# Patient Record
Sex: FEMALE | Race: WHITE | Employment: FULL TIME | RURAL
[De-identification: names, ages, dates, MRNs, and addresses within clinical notes are randomized per-mention and may not be internally consistent; named-entity substitution may affect disease eponyms.]

---

## 2017-01-11 ENCOUNTER — PATIENT OUTREACH (OUTPATIENT)
Dept: FAMILY MEDICINE CLINIC | Age: 62
End: 2017-01-11

## 2017-01-11 NOTE — PROGRESS NOTES
NNTOCED [facility] 9/30-10/1/2016 Atypical chest pain    This episode closed. Patient attended all follow up appointments and had no readmissions during 30 day GALINDO.

## 2017-10-10 ENCOUNTER — OFFICE VISIT (OUTPATIENT)
Dept: FAMILY MEDICINE CLINIC | Age: 62
End: 2017-10-10

## 2017-10-10 VITALS
BODY MASS INDEX: 22.73 KG/M2 | HEART RATE: 61 BPM | TEMPERATURE: 97.2 F | SYSTOLIC BLOOD PRESSURE: 130 MMHG | WEIGHT: 150 LBS | OXYGEN SATURATION: 98 % | HEIGHT: 68 IN | DIASTOLIC BLOOD PRESSURE: 69 MMHG | RESPIRATION RATE: 16 BRPM

## 2017-10-10 DIAGNOSIS — Z00.00 WELL WOMAN EXAM (NO GYNECOLOGICAL EXAM): Primary | ICD-10-CM

## 2017-10-10 DIAGNOSIS — L98.9 SKIN LESION: ICD-10-CM

## 2017-10-10 DIAGNOSIS — K90.49 FOOD INTOLERANCE: ICD-10-CM

## 2017-10-10 DIAGNOSIS — R53.83 FATIGUE, UNSPECIFIED TYPE: ICD-10-CM

## 2017-10-10 DIAGNOSIS — K58.9 IRRITABLE BOWEL SYNDROME WITHOUT DIARRHEA: Chronic | ICD-10-CM

## 2017-10-10 DIAGNOSIS — H11.001 PTERYGIUM OF RIGHT EYE: ICD-10-CM

## 2017-10-10 DIAGNOSIS — M19.041 PRIMARY OSTEOARTHRITIS OF BOTH HANDS: ICD-10-CM

## 2017-10-10 DIAGNOSIS — M19.042 PRIMARY OSTEOARTHRITIS OF BOTH HANDS: ICD-10-CM

## 2017-10-10 LAB
BILIRUB UR QL STRIP: NEGATIVE
GLUCOSE UR-MCNC: NEGATIVE MG/DL
KETONES P FAST UR STRIP-MCNC: NEGATIVE MG/DL
PH UR STRIP: 7 [PH] (ref 4.6–8)
PROT UR QL STRIP: NEGATIVE MG/DL
SP GR UR STRIP: 1.01 (ref 1–1.03)
UA UROBILINOGEN AMB POC: NORMAL (ref 0.2–1)
URINALYSIS CLARITY POC: CLEAR
URINALYSIS COLOR POC: YELLOW
URINE BLOOD POC: NEGATIVE
URINE LEUKOCYTES POC: NEGATIVE
URINE NITRITES POC: NEGATIVE

## 2017-10-10 RX ORDER — ASCORBIC ACID 500 MG
1500 TABLET ORAL
COMMUNITY
End: 2018-12-04

## 2017-10-10 NOTE — PROGRESS NOTES
Progress Note    Patient: Joe Morrell MRN: [id number]  SSN: [id number]    YOB: 1955  Age: 57 y.o. Sex: female        Chief Complaint   Patient presents with    Complete Physical         Subjective:     Encounter Diagnoses   Name Primary?  Well woman exam (no gynecological exam): She has been doing extremely well. She is a limited all red meats from her diet. She mainly eats fruits and vegetables and a few white meats. She feels much better on this diet. Yes    Irritable bowel syndrome without diarrhea: Much better control of her current diet.  Fatigue, unspecified type: She has occasional fatigue and for that reason we should screen her thyroid.  Food intolerance: As above red or greasy meats trigger her IBS.  Skin lesion: She is a suspicious skin lesion on her right lower shin. It blanches but has a raised border. I worry that it could be a early skin cancer and she will need to see the dermatologist.         Pterygium of right eye: She has a tiny pterygium beginning on the medial aspect of her left Iris. It is not encroaching on the IRIS as yet.  Primary osteoarthritis of both hands: she has no redness but  has some pain at the site of an old injury on her right hand, fourth finger. She also has Heberden's nodes forming. Current and past medical information:    Current Medications after this visit[de-identified]     Current Outpatient Prescriptions   Medication Sig    ascorbic acid, vitamin C, (VITAMIN C) 500 mg tablet Take 1,500 mg by mouth.  ferrous sulfate (IRON) 325 mg (65 mg iron) tablet Take 27 mg by mouth Daily (before breakfast).  resveratrol 100 mg cap Take  by mouth.  ROYAL JELLY/BEE POLLEN/P.GINSG (Kiswahili GINSENG COMPLEX PO) Take  by mouth.  nikolai root, bulk, powd by Does Not Apply route.  Calcium-Cholecalciferol, D3, (CALCIUM 600 WITH VITAMIN D3) 600 mg(1,500mg) -400 unit cap Take  by mouth daily.       Cholecalciferol, Vitamin D3, (VITAMIN D3) 1,000 unit cap Take  by mouth daily.  Omega-3 Fatty Acids-Fish Oil (OMEGA 3 FISH OIL) 684-1,200 mg CpDR Take  by mouth.  biotin 500 mcg cap Take  by mouth.  PV W-O CORBY/FERROUS FUMARATE/FA (M-VIT PO) Take  by mouth.  VITAMIN B COMPLEX (B COMPLEX PO) Take  by mouth.  GARLIC PO Take  by mouth.  GINKGO BILOBA PO Take 120 mg by mouth daily. No current facility-administered medications for this visit. Patient Active Problem List    Diagnosis Date Noted    IBS (irritable bowel syndrome) 03/22/2012    Chest discomfort 05/03/2011       Past Medical History:   Diagnosis Date    Anemia NEC     IBS (irritable bowel syndrome)        No Known Allergies    Past Surgical History:   Procedure Laterality Date    DILATION AND CURETTAGE      HX GYN  1979    D&C       Social History     Social History    Marital status:      Spouse name: N/A    Number of children: N/A    Years of education: N/A     Social History Main Topics    Smoking status: Never Smoker    Smokeless tobacco: Never Used    Alcohol use No    Drug use: No    Sexual activity: Not Asked     Other Topics Concern    None     Social History Narrative       Review of Systems   Constitutional: Negative. Negative for chills, fever, malaise/fatigue and weight loss. HENT: Negative. Negative for hearing loss. Eyes: Negative. Negative for blurred vision and double vision. Respiratory: Negative. Negative for cough, hemoptysis, sputum production and shortness of breath. Cardiovascular: Negative. Negative for chest pain, palpitations and orthopnea. Gastrointestinal: Negative. Negative for abdominal pain, blood in stool, constipation, diarrhea, heartburn, nausea and vomiting. Genitourinary: Negative. Negative for dysuria, frequency and urgency. Musculoskeletal: Positive for joint pain. Negative for back pain, myalgias and neck pain. Skin: Negative. Negative for rash.    Neurological: Negative. Negative for dizziness, tingling, tremors, weakness and headaches. Endo/Heme/Allergies: Negative. Psychiatric/Behavioral: Negative. Negative for depression. Objective:     Vitals:    10/10/17 0919   BP: 130/69   Pulse: 61   Resp: 16   Temp: 97.2 °F (36.2 °C)   TempSrc: Oral   SpO2: 98%   Weight: 150 lb (68 kg)   Height: 5' 8\" (1.727 m)      Body mass index is 22.81 kg/(m^2). Physical Exam   Constitutional: She is oriented to person, place, and time and well-developed, well-nourished, and in no distress. No distress. HENT:   Head: Normocephalic and atraumatic. Mouth/Throat: Oropharynx is clear and moist.   Eyes: Conjunctivae are normal.   Neck: No tracheal deviation present. No thyromegaly present. Cardiovascular: Normal rate, regular rhythm and normal heart sounds. No murmur heard. Pulmonary/Chest: Effort normal and breath sounds normal. No respiratory distress. Abdominal: Soft. She exhibits no distension. Lymphadenopathy:     She has no cervical adenopathy. Neurological: She is alert and oriented to person, place, and time. Skin: Skin is warm. No rash noted. She is not diaphoretic. No erythema. Psychiatric: Mood and affect normal.   Nursing note and vitals reviewed. Health Maintenance Due   Topic Date Due    BREAST CANCER SCRN MAMMOGRAM  07/13/2017    INFLUENZA AGE 9 TO ADULT  08/01/2017         Assessment and orders:       ICD-10-CM ICD-9-CM    1. Well woman exam (no gynecological exam) M38.07 T19.8 METABOLIC PANEL, COMPREHENSIVE      LIPID PANEL      CBC W/O DIFF      TSH 3RD GENERATION      AMB POC URINALYSIS DIP STICK AUTO W/O MICRO   2. Irritable bowel syndrome without diarrhea B96.6 457.2 METABOLIC PANEL, COMPREHENSIVE      CBC W/O DIFF   3. Fatigue, unspecified type X24.04 128.84 METABOLIC PANEL, COMPREHENSIVE      TSH 3RD GENERATION   4. Food intolerance   K90.49 579.8 CBC W/O DIFF   5. Skin lesion   L98.9 709.9    6.  Pterygium of right eye   H11.001 372. 40    7. Primary osteoarthritis of both hands M19.041 715.14     M19.042       Plan of care:  Discussed diagnoses in detail with patient. Medication risks/benefits/side effects discussed with patient. All of the patient's questions were addressed. The patient understands and agrees with our plan of care. The patient knows to call back if they are unsure of or forget any changes we discussed today or if the symptoms change. The patient received an After-Visit Summary which contains VS, orders, medication list and allergy list. This can be used as a \"mini-medical record\" should they have to seek medical care while out of town. Patient Care Team:  Watson Lott MD as PCP - Vin Munguia MD (Dermatology)  Ronny Goldman RN as Ambulatory Care Navigator    Follow-up Disposition:  Return in about 1 year (around 10/10/2018). No future appointments.     Signed By: Watson Lott MD     October 10, 2017

## 2017-10-10 NOTE — MR AVS SNAPSHOT
Visit Information Date & Time Provider Department Dept. Phone Encounter #  
 10/10/2017  9:00 AM Rosendo Beckham MD [address] [place] [address] [id number] Follow-up Instructions Return in about 1 year (around 10/10/2018). Upcoming Health Maintenance Date Due  
 BREAST CANCER SCRN MAMMOGRAM 7/13/2017 INFLUENZA AGE 9 TO ADULT 8/1/2017 PAP AKA CERVICAL CYTOLOGY 7/2/2018 DTaP/Tdap/Td series (2 - Td) 9/19/2022 COLONOSCOPY 9/23/2024 Allergies as of 10/10/2017  Review Complete On: 10/10/2017 By: Primus Para, LPN No Known Allergies Current Immunizations  Reviewed on 5/24/2013 Name Date Influenza Vaccine Split 10/4/2012 Influenza Vaccine Whole 11/7/2010 Lyme Disease Vaccine  Incomplete TD Vaccine 2/7/2011 TDAP Vaccine 9/19/2012 ZZZ-RETIRED (DO NOT USE) Pneumococcal Vaccine (Unspecified Type) 3/7/2011 Zoster Vaccine, Live 3/16/2017 Not reviewed this visit You Were Diagnosed With   
  
 Codes Comments Well woman exam (no gynecological exam)    -  Primary ICD-10-CM: Z00.00 ICD-9-CM: V70.0 Irritable bowel syndrome without diarrhea     ICD-10-CM: K58.9 ICD-9-CM: 631.1 Fatigue, unspecified type     ICD-10-CM: R53.83 ICD-9-CM: 780.79 Food intolerance     ICD-10-CM: K90.49 ICD-9-CM: 579.8 Skin lesion     ICD-10-CM: L98.9 ICD-9-CM: 709.9 Pterygium of right eye     ICD-10-CM: H11.001 ICD-9-CM: 372.40 Primary osteoarthritis of both hands     ICD-10-CM: M19.041, D80.834 ICD-9-CM: 715.14 Vitals BP Pulse Temp Resp Height(growth percentile) Weight(growth percentile) 130/69 (BP 1 Location: Left arm, BP Patient Position: Sitting) 61 97.2 °F (36.2 °C) (Oral) 16 5' 8\" (1.727 m) 150 lb (68 kg) SpO2 BMI OB Status Smoking Status 98% 22.81 kg/m2 Postmenopausal Never Smoker Vitals History BMI and BSA Data Body Mass Index Body Surface Area 22.81 kg/m 2 1.81 m 2 Preferred Pharmacy Pharmacy Name Phone [address] [facility] [place] [address] [facility] [phone] Your Updated Medication List  
  
   
This list is accurate as of: 10/10/17  9:59 AM.  Always use your most recent med list.  
  
  
  
  
 B COMPLEX PO Take  by mouth.  
  
 biotin 500 mcg Cap Take  by mouth. CALCIUM 600 WITH VITAMIN D3 600 mg(1,500mg) -400 unit Cap Generic drug:  Calcium-Cholecalciferol (D3) Take  by mouth daily. GARLIC PO Take  by mouth.  
  
 nikolai root (bulk) Powd  
by Does Not Apply route. GINKGO BILOBA PO Take 120 mg by mouth daily. Iron 325 mg (65 mg iron) tablet Generic drug:  ferrous sulfate Take 27 mg by mouth Daily (before breakfast). KOREAN GINSENG COMPLEX PO Take  by mouth. M-VIT PO Take  by mouth. OMEGA 3 FISH -1,200 mg Cpdr  
Generic drug:  Omega-3 Fatty Acids-Fish Oil Take  by mouth. resveratrol 100 mg Cap Take  by mouth. VITAMIN C 500 mg tablet Generic drug:  ascorbic acid (vitamin C) Take 1,500 mg by mouth. VITAMIN D3 1,000 unit Cap Generic drug:  cholecalciferol Take  by mouth daily. We Performed the Following AMB POC URINALYSIS DIP STICK AUTO W/O MICRO [83624 CPT(R)] CBC W/O DIFF [19209 CPT(R)] LIPID PANEL [92300 CPT(R)] METABOLIC PANEL, COMPREHENSIVE [82760 CPT(R)] TSH 3RD GENERATION [24679 CPT(R)] Follow-up Instructions Return in about 1 year (around 10/10/2018). Patient Instructions A Healthy Lifestyle: Care Instructions Your Care Instructions A healthy lifestyle can help you feel good, stay at a healthy weight, and have plenty of energy for both work and play. A healthy lifestyle is something you can share with your whole family.  
A healthy lifestyle also can lower your risk for serious health problems, such as high blood pressure, heart disease, and diabetes. You can follow a few steps listed below to improve your health and the health of your family. Follow-up care is a key part of your treatment and safety. Be sure to make and go to all appointments, and call your doctor if you are having problems. Its also a good idea to know your test results and keep a list of the medicines you take. How can you care for yourself at home? · Do not eat too much sugar, fat, or fast foods. You can still have dessert and treats now and then. The goal is moderation. · Start small to improve your eating habits. Pay attention to portion sizes, drink less juice and soda pop, and eat more fruits and vegetables. ¨ Eat a healthy amount of food. A 3-ounce serving of meat, for example, is about the size of a deck of cards. Fill the rest of your plate with vegetables and whole grains. ¨ Limit the amount of soda and sports drinks you have every day. Drink more water when you are thirsty. ¨ Eat at least 5 servings of fruits and vegetables every day. It may seem like a lot, but it is not hard to reach this goal. A serving or helping is 1 piece of fruit, 1 cup of vegetables, or 2 cups of leafy, raw vegetables. Have an apple or some carrot sticks as an afternoon snack instead of a candy bar. Try to have fruits and/or vegetables at every meal. 
· Make exercise part of your daily routine. You may want to start with simple activities, such as walking, bicycling, or slow swimming. Try to be active 30 to 60 minutes every day. You do not need to do all 30 to 60 minutes all at once. For example, you can exercise 3 times a day for 10 or 20 minutes. Moderate exercise is safe for most people, but it is always a good idea to talk to your doctor before starting an exercise program. 
· Keep moving. [place] the lawn, work in the garden, or [employer]. Take the stairs instead of the elevator at work. · If you smoke, quit. People who smoke have an increased risk for heart attack, stroke, cancer, and other lung illnesses. Quitting is hard, but there are ways to boost your chance of quitting tobacco for good. ¨ Use nicotine gum, patches, or lozenges. ¨ Ask your doctor about stop-smoking programs and medicines. ¨ Keep trying. In addition to reducing your risk of diseases in the future, you will notice some benefits soon after you stop using tobacco. If you have shortness of breath or asthma symptoms, they will likely get better within a few weeks after you quit. · Limit how much alcohol you drink. Moderate amounts of alcohol (up to 2 drinks a day for men, 1 drink a day for women) are okay. But drinking too much can lead to liver problems, high blood pressure, and other health problems. Family health If you have a family, there are many things you can do together to improve your health. · Eat meals together as a family as often as possible. · Eat healthy foods. This includes fruits, vegetables, lean meats and dairy, and whole grains. · Include your family in your fitness plan. Most people think of activities such as jogging or tennis as the way to fitness, but there are many ways you and your family can be more active. Anything that makes you breathe hard and gets your heart pumping is exercise. Here are some tips: 
¨ Walk to do errands or to take your child to school or the bus. ¨ Go for a family bike ride after dinner instead of watching TV. Where can you learn more? Go to http://ambrosio-hero.info/. Enter O313 in the search box to learn more about \"A Healthy Lifestyle: Care Instructions. \" Current as of: July 26, 2016 Content Version: 11.3 © 9402-8013 [employer]. Care instructions adapted under license by [employer] (which disclaims liability or warranty for this information).  If you have questions about a medical condition or this instruction, always ask your healthcare professional. Lety Rivera any warranty or liability for your use of this information. Introducing [facility] & HEALTH SERVICES! Dear Sarahy Dorsey: 
Thank you for requesting a [employer] account. Our records indicate that you already have an active [employer] account. You can access your account anytime at [email or web address] Did you know that you can access your hospital and ER discharge instructions at any time in [employer]? You can also review all of your test results from your hospital stay or ER visit. Additional Information If you have questions, please visit the Frequently Asked Questions section of the [employer] website at [email or web address] Remember, [employer] is NOT to be used for urgent needs. For medical emergencies, dial 911. Now available from your iPhone and Android! Please provide this summary of care documentation to your next provider. Your primary care clinician is listed as Π[address] 90. If you have any questions after today's visit, please call [phone].

## 2017-10-10 NOTE — PATIENT INSTRUCTIONS

## 2017-10-10 NOTE — PROGRESS NOTES
Reviewed record in preparation for visit and have obtained necessary documentation. Patient did not bring medications to visit for review. Information provided on Advanced Directive, Living Will. Body mass index is 22.81 kg/(m^2).    Health Maintenance Due   Topic Date Due    BREAST CANCER SCRN MAMMOGRAM  07/13/2017    INFLUENZA AGE 9 TO ADULT  08/01/2017

## 2017-10-11 LAB
ALBUMIN SERPL-MCNC: 4.2 G/DL (ref 3.6–4.8)
ALBUMIN/GLOB SERPL: 2 {RATIO} (ref 1.2–2.2)
ALP SERPL-CCNC: 58 IU/L (ref 39–117)
ALT SERPL-CCNC: 16 IU/L (ref 0–32)
AST SERPL-CCNC: 20 IU/L (ref 0–40)
BILIRUB SERPL-MCNC: 0.6 MG/DL (ref 0–1.2)
BUN SERPL-MCNC: 16 MG/DL (ref 8–27)
BUN/CREAT SERPL: 29 (ref 12–28)
CALCIUM SERPL-MCNC: 9.4 MG/DL (ref 8.7–10.3)
CHLORIDE SERPL-SCNC: 102 MMOL/L (ref 96–106)
CHOLEST SERPL-MCNC: 147 MG/DL (ref 100–199)
CO2 SERPL-SCNC: 25 MMOL/L (ref 18–29)
CREAT SERPL-MCNC: 0.55 MG/DL (ref 0.57–1)
ERYTHROCYTE [DISTWIDTH] IN BLOOD BY AUTOMATED COUNT: 14.1 % (ref 12.3–15.4)
GLOBULIN SER CALC-MCNC: 2.1 G/DL (ref 1.5–4.5)
GLUCOSE SERPL-MCNC: 92 MG/DL (ref 65–99)
HCT VFR BLD AUTO: 36.4 % (ref 34–46.6)
HDLC SERPL-MCNC: 58 MG/DL
HGB BLD-MCNC: 12.3 G/DL (ref 11.1–15.9)
LDLC SERPL CALC-MCNC: 77 MG/DL (ref 0–99)
MCH RBC QN AUTO: 29.3 PG (ref 26.6–33)
MCHC RBC AUTO-ENTMCNC: 33.8 G/DL (ref 31.5–35.7)
MCV RBC AUTO: 87 FL (ref 79–97)
PLATELET # BLD AUTO: 235 X10E3/UL (ref 150–379)
POTASSIUM SERPL-SCNC: 3.9 MMOL/L (ref 3.5–5.2)
PROT SERPL-MCNC: 6.3 G/DL (ref 6–8.5)
RBC # BLD AUTO: 4.2 X10E6/UL (ref 3.77–5.28)
SODIUM SERPL-SCNC: 142 MMOL/L (ref 134–144)
TRIGL SERPL-MCNC: 59 MG/DL (ref 0–149)
TSH SERPL DL<=0.005 MIU/L-ACNC: 3.83 UIU/ML (ref 0.45–4.5)
VLDLC SERPL CALC-MCNC: 12 MG/DL (ref 5–40)
WBC # BLD AUTO: 3.3 X10E3/UL (ref 3.4–10.8)

## 2017-11-15 ENCOUNTER — OFFICE VISIT (OUTPATIENT)
Dept: FAMILY MEDICINE CLINIC | Age: 62
End: 2017-11-15

## 2017-11-15 VITALS
SYSTOLIC BLOOD PRESSURE: 133 MMHG | OXYGEN SATURATION: 100 % | TEMPERATURE: 97.5 F | RESPIRATION RATE: 16 BRPM | HEART RATE: 67 BPM | BODY MASS INDEX: 22.01 KG/M2 | WEIGHT: 145.2 LBS | HEIGHT: 68 IN | DIASTOLIC BLOOD PRESSURE: 67 MMHG

## 2017-11-15 DIAGNOSIS — M19.041 ARTHRITIS OF RIGHT HAND: Primary | ICD-10-CM

## 2017-11-15 DIAGNOSIS — K58.8 OTHER IRRITABLE BOWEL SYNDROME: ICD-10-CM

## 2017-11-15 DIAGNOSIS — H61.22 EXCESSIVE EAR WAX, LEFT: ICD-10-CM

## 2017-11-15 NOTE — MR AVS SNAPSHOT
Visit Information Date & Time Provider Department Dept. Phone Encounter #  
 11/15/2017 10:30 AM Aurelia Watson MD [address] [place] [address] [id number] Follow-up Instructions Return in about 3 months (around 2/15/2018). Upcoming Health Maintenance Date Due  
 BREAST CANCER SCRN MAMMOGRAM 7/13/2017 PAP AKA CERVICAL CYTOLOGY 7/2/2018 DTaP/Tdap/Td series (2 - Td) 9/19/2022 COLONOSCOPY 9/23/2024 Allergies as of 11/15/2017  Review Complete On: 10/10/2017 By: Aurelia Watson MD  
 No Known Allergies Current Immunizations  Reviewed on 5/24/2013 Name Date Influenza Vaccine Split 10/4/2012 Influenza Vaccine Whole 11/7/2010 Lyme Disease Vaccine  Incomplete TD Vaccine 2/7/2011 TDAP Vaccine 9/19/2012 ZZZ-RETIRED (DO NOT USE) Pneumococcal Vaccine (Unspecified Type) 3/7/2011 Zoster Vaccine, Live 3/16/2017 Not reviewed this visit You Were Diagnosed With   
  
 Codes Comments Arthritis of right hand    -  Primary ICD-10-CM: M19.041 ICD-9-CM: 716.94 Other irritable bowel syndrome     ICD-10-CM: K58.8 ICD-9-CM: 772.5 Excessive ear wax, left     ICD-10-CM: H61.22 
ICD-9-CM: 380.4 Vitals BP Pulse Temp Resp Height(growth percentile) Weight(growth percentile) 133/67 (BP 1 Location: Left arm, BP Patient Position: Sitting) 67 97.5 °F (36.4 °C) (Oral) 16 5' 8\" (1.727 m) 145 lb 3.2 oz (65.9 kg) SpO2 BMI OB Status Smoking Status 100% 22.08 kg/m2 Postmenopausal Never Smoker Vitals History BMI and BSA Data Body Mass Index Body Surface Area 22.08 kg/m 2 1.78 m 2 Preferred Pharmacy Pharmacy Name Phone [address] [place], [place] - [address] [phone] Your Updated Medication List  
  
   
This list is accurate as of: 11/15/17 11:17 AM.  Always use your most recent med list.  
  
  
  
  
 VITAMIN C 500 mg tablet Generic drug:  ascorbic acid (vitamin C) Take 1,500 mg by mouth. We Performed the Following REFERRAL TO GASTROENTEROLOGY [JGU52 Custom] Comments:  
 Ref to GI in Dr. Sylvain Bernal group at [facility]. IBS with recent sx changes Follow-up Instructions Return in about 3 months (around 2/15/2018). To-Do List   
 11/15/2017 Imaging:  XR HAND RT MIN 3 V Referral Information Referral ID Referred By Referred To  
  
 [id number] Joss Joseph Not Available Visits Status Start Date End Date 1 New Request 11/15/17 11/15/18 If your referral has a status of pending review or denied, additional information will be sent to support the outcome of this decision. Patient Instructions Irritable Bowel Syndrome: Care Instructions Your Care Instructions Irritable bowel syndrome, or IBS, is a problem with the intestines that causes belly pain, bloating, gas, constipation, and diarrhea. The cause of IBS is not well known. IBS can last for many years, but it does not get worse over time or lead to serious disease. Most people can control their symptoms by changing their diet and reducing stress. Follow-up care is a key part of your treatment and safety. Be sure to make and go to all appointments, and call your doctor if you are having problems. It's also a good idea to know your test results and keep a list of the medicines you take. How can you care for yourself at home? · For constipation: 
¨ Include fruits, vegetables, beans, and whole grains in your diet each day. These foods are high in fiber. ¨ Drink plenty of fluids, enough so that your urine is light yellow or clear like water. If you have kidney, heart, or liver disease and have to limit fluids, talk with your doctor before you increase the amount of fluids you drink. ¨ Get some exercise every day. Build up slowly to 30 to 60 minutes a day on 5 or more days of the week. ¨ Take a fiber supplement, such as Citrucel or Metamucil, every day if needed. Read and follow all instructions on the label. ¨ Schedule time each day for a bowel movement. Having a daily routine may help. Take your time and do not strain when having a bowel movement. · If you often have diarrhea, limit foods and drinks that make it worse. These are different for each person but may include caffeine (found in coffee, tea, chocolate, and cola drinks), alcohol, fatty foods, gas-producing foods (such as beans, cabbage, and broccoli), some dairy products, and spicy foods. Do not eat candy or gum that contains sorbitol. · Keep a daily diary of what you eat and what symptoms you have. This may help find foods that cause you problems. · Eat slowly. Try to make mealtime relaxing. · Find ways to reduce stress. · Get at least 30 minutes of exercise on most days of the week. Exercise can help reduce tension and prevent constipation. Walking is a good choice. You also may want to do other activities, such as running, swimming, cycling, or playing tennis or team sports. When should you call for help? Call your doctor now or seek immediate medical care if: 
? · Your pain is different than usual or occurs with fever. ? · You lose weight without trying, or you lose your appetite and you do not know why.  
? · Your symptoms often wake you from sleep. ? · Your stools are black and tarlike or have streaks of blood. ? Watch closely for changes in your health, and be sure to contact your doctor if: 
? · Your IBS symptoms get worse or begin to disrupt your day-to-day life. ? · You become more tired than usual.  
? · Your home treatment stops working. Where can you learn more? Go to http://ambrosio-hreo.info/. Enter D564 in the search box to learn more about \"Irritable Bowel Syndrome: Care Instructions. \" Current as of: May 12, 2017 Content Version: 11.4 © 4099-6621 Healthwise[employer] Incorporated. Care instructions adapted under license by [employer] (which disclaims liability or warranty for this information). If you have questions about a medical condition or this instruction, always ask your healthcare professional. [address] any warranty or liability for your use of this information. Introducing hospitals & HEALTH SERVICES! Dear [place]: 
Thank you for requesting a [employer] account. Our records indicate that you already have an active [employer] account. You can access your account anytime at [email or web address] Did you know that you can access your hospital and ER discharge instructions at any time in [employer]? You can also review all of your test results from your hospital stay or ER visit. Additional Information If you have questions, please visit the Frequently Asked Questions section of the [employer] website at [email or web address] Remember, [employer] is NOT to be used for urgent needs. For medical emergencies, dial 911. Now available from your iPhone and Android! Please provide this summary of care documentation to your next provider. Your primary care clinician is listed as Π[address] 90. If you have any questions after today's visit, please call [phone].

## 2017-11-15 NOTE — PROGRESS NOTES
Chief Complaint   Patient presents with    Irritable Bowel Syndrome     Body mass index is 22.08 kg/(m^2). 1. Have you been to the ER, urgent care clinic since your last visit? Hospitalized since your last visit? No    2. Have you seen or consulted any other health care providers outside of the [address] [place] [address] since your last visit? Include any pap smears or colon screening.  No    Reviewed record in preparation for visit and have necessary documentation  Pt did not bring medication to office visit for review  Opportunity was given for questions  Goals that were addressed and/or need to be completed after this appointment include:         Health Maintenance Due   Topic Date Due    BREAST CANCER SCRN MAMMOGRAM  07/13/2017

## 2017-11-15 NOTE — PATIENT INSTRUCTIONS
Irritable Bowel Syndrome: Care Instructions  Your Care Instructions  Irritable bowel syndrome, or IBS, is a problem with the intestines that causes belly pain, bloating, gas, constipation, and diarrhea. The cause of IBS is not well known. IBS can last for many years, but it does not get worse over time or lead to serious disease. Most people can control their symptoms by changing their diet and reducing stress. Follow-up care is a key part of your treatment and safety. Be sure to make and go to all appointments, and call your doctor if you are having problems. It's also a good idea to know your test results and keep a list of the medicines you take. How can you care for yourself at home? · For constipation:  ¨ Include fruits, vegetables, beans, and whole grains in your diet each day. These foods are high in fiber. ¨ Drink plenty of fluids, enough so that your urine is light yellow or clear like water. If you have kidney, heart, or liver disease and have to limit fluids, talk with your doctor before you increase the amount of fluids you drink. ¨ Get some exercise every day. Build up slowly to 30 to 60 minutes a day on 5 or more days of the week. ¨ Take a fiber supplement, such as Citrucel or Metamucil, every day if needed. Read and follow all instructions on the label. ¨ Schedule time each day for a bowel movement. Having a daily routine may help. Take your time and do not strain when having a bowel movement. · If you often have diarrhea, limit foods and drinks that make it worse. These are different for each person but may include caffeine (found in coffee, tea, chocolate, and cola drinks), alcohol, fatty foods, gas-producing foods (such as beans, cabbage, and broccoli), some dairy products, and spicy foods. Do not eat candy or gum that contains sorbitol. · Keep a daily diary of what you eat and what symptoms you have. This may help find foods that cause you problems. · Eat slowly.  Try to make mealtime relaxing. · Find ways to reduce stress. · Get at least 30 minutes of exercise on most days of the week. Exercise can help reduce tension and prevent constipation. Walking is a good choice. You also may want to do other activities, such as running, swimming, cycling, or playing tennis or team sports. When should you call for help? Call your doctor now or seek immediate medical care if:  ? · Your pain is different than usual or occurs with fever. ? · You lose weight without trying, or you lose your appetite and you do not know why.   ? · Your symptoms often wake you from sleep. ? · Your stools are black and tarlike or have streaks of blood. ? Watch closely for changes in your health, and be sure to contact your doctor if:  ? · Your IBS symptoms get worse or begin to disrupt your day-to-day life. ? · You become more tired than usual.   ? · Your home treatment stops working. Where can you learn more? Go to http://ambrosio-hero.info/. Enter D576 in the search box to learn more about \"Irritable Bowel Syndrome: Care Instructions. \"  Current as of: May 12, 2017  Content Version: 11.4  © 8424-6064 [employer]. Care instructions adapted under license by [employer] (which disclaims liability or warranty for this information). If you have questions about a medical condition or this instruction, always ask your healthcare professional. [address] any warranty or liability for your use of this information.

## 2017-11-16 NOTE — PROGRESS NOTES
Progress Note    Patient: Sanford Bernard MRN: [id number]  SSN: [id number]    YOB: 1955  Age: 57 y.o. Sex: female        Chief Complaint   Patient presents with    Irritable Bowel Syndrome         Subjective:     Encounter Diagnoses   Name Primary?  Arthritis of right hand: She has two bony nodules on her right hand. She is left-handed. No history injury. Yes    Other irritable bowel syndrome: She's always had irritable bowel syndrome but recently it has gotten worse. She is had some fecal urgency and fecal incontinence because of the severe stomach cramps she's having. I explained our owner her back on probiotics daily and she also needs to see a G. I. specialist. She is no longer taking the multiple supplements that she used to take.  Excessive ear wax, left: Left ear irrigated while she was here. Current and past medical information:    Current Medications after this visit[de-identified]   Current Outpatient Prescriptions   Medication Sig    ascorbic acid, vitamin C, (VITAMIN C) 500 mg tablet Take 1,500 mg by mouth. No current facility-administered medications for this visit. Patient Active Problem List    Diagnosis Date Noted    IBS (irritable bowel syndrome) 03/22/2012    Chest discomfort 05/03/2011       Past Medical History:   Diagnosis Date    Anemia NEC     IBS (irritable bowel syndrome)        No Known Allergies    Past Surgical History:   Procedure Laterality Date    DILATION AND CURETTAGE      HX GYN  1979    D&C       Social History     Social History    Marital status:      Spouse name: N/A    Number of children: N/A    Years of education: N/A     Social History Main Topics    Smoking status: Never Smoker    Smokeless tobacco: Never Used    Alcohol use No    Drug use: No    Sexual activity: Not Asked     Other Topics Concern    None     Social History Narrative       Review of Systems   Constitutional: Negative.   Negative for chills, fever, malaise/fatigue and weight loss. HENT: Negative. Negative for hearing loss. Eyes: Negative. Negative for blurred vision and double vision. Respiratory: Negative. Negative for cough, hemoptysis, sputum production and shortness of breath. Cardiovascular: Negative. Negative for chest pain, palpitations and orthopnea. Gastrointestinal: Positive for abdominal pain. Negative for blood in stool, constipation, heartburn, nausea and vomiting. Fecal urgency and incontinence of soft stool. This is a new problem. Genitourinary: Negative. Negative for dysuria, frequency and urgency. Musculoskeletal: Negative. Negative for back pain, myalgias and neck pain. Skin: Negative. Negative for rash. Neurological: Negative. Negative for dizziness, tingling, tremors, weakness and headaches. Endo/Heme/Allergies: Negative. Psychiatric/Behavioral: Negative. Negative for depression. Objective:     Vitals:    11/15/17 1036   BP: 133/67   Pulse: 67   Resp: 16   Temp: 97.5 °F (36.4 °C)   TempSrc: Oral   SpO2: 100%   Weight: 145 lb 3.2 oz (65.9 kg)   Height: 5' 8\" (1.727 m)      Body mass index is 22.08 kg/(m^2). Physical Exam   Constitutional: She is oriented to person, place, and time and well-developed, well-nourished, and in no distress. No distress. HENT:   Head: Normocephalic and atraumatic. Right Ear: External ear normal.   Mouth/Throat: Oropharynx is clear and moist.   Wax built-up and left ear. Eyes: Conjunctivae are normal.   Neck: No tracheal deviation present. No thyromegaly present. Cardiovascular: Normal rate, regular rhythm and normal heart sounds. No murmur heard. Pulmonary/Chest: Effort normal and breath sounds normal. No respiratory distress. Abdominal: Soft. She exhibits no distension. Lymphadenopathy:     She has no cervical adenopathy. Neurological: She is alert and oriented to person, place, and time. Skin: Skin is warm. No rash noted.  She is not diaphoretic. No erythema. Psychiatric: Mood and affect normal.   Nursing note and vitals reviewed. Health Maintenance Due   Topic Date Due    BREAST CANCER SCRN MAMMOGRAM  07/13/2017         Assessment and orders:       ICD-10-CM    1. Arthritis of right hand-Unclear why     M19.041 XR HAND RT MIN 3 V   2. Other irritable bowel syndrome-We had a long discussion about her dietary habits and bowel habits. She will restart her aligne every day. She will contact me if her symptoms get worse before she sees IAN   K58.8 REFERRAL TO GASTROENTEROLOGY   3. Excessive ear wax, left H61.22      Over 50% of the25 minutes face to face with Dottie Rosales consisted of counseling and/or discussing treatment plans in reference to her IBS. Plan of care:  Discussed diagnoses in detail with patient. Medication risks/benefits/side effects discussed with patient. All of the patient's questions were addressed. The patient understands and agrees with our plan of care. The patient knows to call back if they are unsure of or forget any changes we discussed today or if the symptoms change. The patient received an After-Visit Summary which contains VS, orders, medication list and allergy list. This can be used as a \"mini-medical record\" should they have to seek medical care while out of town. Patient Care Team:  Ksenia Boone MD as PCP - Debra Scott MD (Dermatology)  Temitope Ramirez RN as Ambulatory Care Navigator    Follow-up Disposition:  Return in about 3 months (around 2/15/2018).     Future Appointments  Date Time Provider Juanita Isabel   2/16/2018 10:10 AM Ksenia Boone MD [facility] SCHED       Signed By: Ksenia Boone MD     November 15, 2017

## 2017-11-22 ENCOUNTER — TELEPHONE (OUTPATIENT)
Dept: FAMILY MEDICINE CLINIC | Age: 62
End: 2017-11-22

## 2017-11-22 DIAGNOSIS — M19.90 INFLAMMATORY ARTHRITIS: Primary | ICD-10-CM

## 2017-11-22 NOTE — TELEPHONE ENCOUNTER
Per Dr. Coty Leon:      Hand x-ray shows possible rheumatoid arthritis. Nanda Cheema will need to see an rheumatologist. Gurpreet Prieto will try to set that up in the [facility] area if she is agreeable.               Detailed letter mailed to patient informing her of her results.

## 2017-11-22 NOTE — TELEPHONE ENCOUNTER
Referral is fine. Would prefer Friday mornings if possible because of transportation. If not possible, then she will have to adjust. Please call.

## 2018-07-03 ENCOUNTER — OFFICE VISIT (OUTPATIENT)
Dept: FAMILY MEDICINE CLINIC | Age: 63
End: 2018-07-03

## 2018-07-03 VITALS
TEMPERATURE: 97.7 F | WEIGHT: 146 LBS | DIASTOLIC BLOOD PRESSURE: 79 MMHG | BODY MASS INDEX: 22.13 KG/M2 | HEIGHT: 68 IN | OXYGEN SATURATION: 100 % | SYSTOLIC BLOOD PRESSURE: 131 MMHG | RESPIRATION RATE: 61 BRPM | HEART RATE: 61 BPM

## 2018-07-03 DIAGNOSIS — T78.1XXA FOOD SENSITIVITY WITH GASTROINTESTINAL SYMPTOMS: ICD-10-CM

## 2018-07-03 DIAGNOSIS — E78.00 ELEVATED CHOLESTEROL: ICD-10-CM

## 2018-07-03 DIAGNOSIS — K58.9 IRRITABLE BOWEL SYNDROME WITHOUT DIARRHEA: Primary | Chronic | ICD-10-CM

## 2018-07-03 NOTE — PROGRESS NOTES
Progress Note    Patient: Tereso Roberts MRN: [id number]  SSN: [id number]    YOB: 1955  Age: 70 y.o. Sex: female        Chief Complaint   Patient presents with    Welcome To Medicare         Subjective:     Encounter Diagnoses   Name Primary?  Irritable bowel syndrome without diarrhea:  Her IBS symptoms come and go. The GI specialist told her it was mainly stress. Is under tremendous family stress and that her granddaughter is repeatedly sick requiring specialty admissions at [facility]. Yes    Food sensitivity with gastrointestinal symptoms: She is not sure which foods in particular bother her but she has not had any red meat in several years. She would like some guidance on which foods are best for her. Have given her handout on irritable bowel syndrome and I am also going to test her for Basic food allergies.  Elevated cholesterol:  Cardiovascular risks for her are: LDL goal is under 100  hypertension  hyperlipidemia. Key Antihyperlipidemia Meds     The patient is on no antihyperlipidemia meds. Lab Results   Component Value Date/Time    Cholesterol, total 147 10/10/2017 11:35 AM    HDL Cholesterol 58 10/10/2017 11:35 AM    LDL, calculated 77 10/10/2017 11:35 AM    Triglyceride 59 10/10/2017 11:35 AM    CHOL/HDL Ratio 2.4 03/30/2010 09:22 AM     Lab Results   Component Value Date/Time    ALT (SGPT) 16 10/10/2017 11:35 AM    AST (SGOT) 20 10/10/2017 11:35 AM    Alk. phosphatase 58 10/10/2017 11:35 AM    Bilirubin, total 0.6 10/10/2017 11:35 AM      Myalgias: No   Fatigue: No   Other side effects: no  Wt Readings from Last 3 Encounters:   07/03/18 146 lb (66.2 kg)   11/15/17 145 lb 3.2 oz (65.9 kg)   10/10/17 150 lb (68 kg)     The patient is aware of our goal to reduce or eliminate the long term problems (such as strokes and heart attacks) related to poorly controlled hyperlipidemia.             Current and past medical information:    Current Medications after this visit[de-identified] Current Outpatient Prescriptions   Medication Sig    digestive enzymes tab Take  by mouth.  pneumococcal 13 edmund conj dip (PREVNAR-13) 0.5 mL syrg injection 0.5 mL by IntraMUSCular route once for 1 dose. To be administered at Pharmacy. Fax confirmation to me at [phone]. Thank You. Indications: PREVENTION OF STREPTOCOCCUS PNEUMONIAE INFECTION    ascorbic acid, vitamin C, (VITAMIN C) 500 mg tablet Take 1,500 mg by mouth. No current facility-administered medications for this visit. Patient Active Problem List    Diagnosis Date Noted    IBS (irritable bowel syndrome) 03/22/2012    Chest discomfort 05/03/2011       Past Medical History:   Diagnosis Date    Anemia NEC     IBS (irritable bowel syndrome)        No Known Allergies    Past Surgical History:   Procedure Laterality Date    DILATION AND CURETTAGE      HX GYN  1979    D&C       Social History     Social History    Marital status:      Spouse name: N/A    Number of children: N/A    Years of education: N/A     Social History Main Topics    Smoking status: Never Smoker    Smokeless tobacco: Never Used    Alcohol use No    Drug use: No    Sexual activity: Not Asked     Other Topics Concern    None     Social History Narrative       Review of Systems   Constitutional: Negative. Negative for chills, fever, malaise/fatigue and weight loss. HENT: Negative. Negative for hearing loss. Recent severe wax impaction her left ear. She finally got a plug of wax out of it. Eyes: Negative. Negative for blurred vision and double vision. Respiratory: Negative. Negative for cough, hemoptysis, sputum production and shortness of breath. Cardiovascular: Negative. Negative for chest pain, palpitations and orthopnea. Gastrointestinal: Negative. Negative for abdominal pain, blood in stool, heartburn, nausea and vomiting. Genitourinary: Negative. Negative for dysuria, frequency and urgency.    Musculoskeletal: Negative. Negative for back pain, myalgias and neck pain. Skin: Negative. Negative for rash. Neurological: Negative. Negative for dizziness, tingling, tremors, weakness and headaches. Endo/Heme/Allergies: Negative. Psychiatric/Behavioral: Negative. Negative for depression. We spent a significant amount of time talking about her sick granddaughter. Dottie and her daughter are under tremendous stress because of this-her granddaughter frequently has life-threatening illnesses. She has an immune deficiency as well as an inability to eat. She gets TPN. Objective:     Vitals:    07/03/18 0803   BP: 131/79   Pulse: 61   Resp: (!) 61   Temp: 97.7 °F (36.5 °C)   TempSrc: Oral   SpO2: 100%   Weight: 146 lb (66.2 kg)   Height: 5' 8\" (1.727 m)      Body mass index is 22.2 kg/(m^2). Physical Exam   Constitutional: She is oriented to person, place, and time and well-developed, well-nourished, and in no distress. No distress. HENT:   Head: Normocephalic and atraumatic. Right Ear: External ear normal.   Left Ear: External ear normal.   Mouth/Throat: Oropharynx is clear and moist.   Eyes: Conjunctivae are normal.   Neck: No tracheal deviation present. No thyromegaly present. Cardiovascular: Normal rate, regular rhythm and normal heart sounds. No murmur heard. Pulmonary/Chest: Effort normal and breath sounds normal. No respiratory distress. Abdominal: Soft. She exhibits no distension. Lymphadenopathy:     She has no cervical adenopathy. Neurological: She is alert and oriented to person, place, and time. Skin: Skin is warm. No rash noted. She is not diaphoretic. No erythema. Psychiatric: Mood and affect normal.   Nursing note and vitals reviewed.         Health Maintenance Due   Topic Date Due    BREAST CANCER SCRN MAMMOGRAM  07/13/2017    GLAUCOMA SCREENING Q2Y  05/18/2018    Pneumococcal 65+ Low/Medium Risk (2 of 2 - PPSV23) 05/18/2018         Assessment and orders:     Encounter Diagnoses     ICD-10-CM ICD-9-CM   1. Irritable bowel syndrome without diarrhea K58.9 564.1   2. Food sensitivity with gastrointestinal symptoms R19.8 787.99   3. Elevated cholesterol E78.00 272.0     Diagnoses and all orders for this visit:    1. Irritable bowel syndrome without diarrhea  -     FOOD ALLERGY PROFILE  -     METABOLIC PANEL, COMPREHENSIVE    2. Food sensitivity with gastrointestinal symptoms  -     FOOD ALLERGY PROFILE  -     METABOLIC PANEL, COMPREHENSIVE  -     CBC WITH AUTOMATED DIFF    3. Elevated cholesterol  -     LIPID PANEL    Preventative CARE--she was given a prescription for shingrix. -     pneumococcal 13 edmund conj dip (PREVNAR-13) 0.5 mL syrg injection; 0.5 mL by IntraMUSCular route once for 1 dose. To be administered at Pharmacy. Fax confirmation to me at [phone]. Thank You. Indications: PREVENTION OF STREPTOCOCCUS PNEUMONIAE INFECTION            Plan of care:  Discussed diagnoses in detail with patient. Medication risks/benefits/side effects discussed with patient. All of the patient's questions were addressed. The patient understands and agrees with our plan of care. The patient knows to call back if they are unsure of or forget any changes we discussed today or if the symptoms change. The patient received an After-Visit Summary which contains VS, orders, medication list and allergy list. This can be used as a \"mini-medical record\" should they have to seek medical care while out of town. Patient Care Team:  Rosendo Beckham MD as PCP - Danya Sumner MD (Dermatology)  Nel Dasilva RN as [address] [place] [address]  Syed Bojorquez MD (Rheumatology)    Follow-up Disposition:  Return in about 6 months (around 1/3/2019).     Future Appointments  Date Time Provider Juanita Hall   10/16/2018 9:00 AM Rosendo Beckham MD [facility] SCHED       Signed By: Rosendo Beckham MD     July 3, 2018

## 2018-07-03 NOTE — PATIENT INSTRUCTIONS
Diet for Irritable Bowel Syndrome: Care Instructions  Your Care Instructions    Irritable bowel syndrome, or IBS, is a problem with the intestines. IBS can cause belly pain, bloating, gas, constipation, and diarrhea. Most people can control their symptoms by changing their diet and easing stress. No specific foods cause everyone with IBS to have symptoms. Doctors don't offer a specific diet to manage symptoms. But many people find that they feel better when they stop eating certain foods. A high-fiber diet may help if you have constipation. Follow-up care is a key part of your treatment and safety. Be sure to make and go to all appointments, and call your doctor if you are having problems. It's also a good idea to know your test results and keep a list of the medicines you take. How can you care for yourself at home? To reduce constipation  · Include fruits, vegetables, beans, and whole grains in your diet each day. These foods are high in fiber. Slowly increase the amount of fiber you eat. This helps you avoid a lot of gas. · Drink plenty of fluids, enough so that your urine is light yellow or clear like water. If you have kidney, heart, or liver disease and have to limit fluids, talk with your doctor before you increase the amount of fluids you drink. · Get some exercise every day. Build up slowly to 30 to 60 minutes a day on 5 or more days of the week. · Take a fiber supplement, such as Citrucel or Metamucil, every day if needed. Read and follow all instructions on the label. · Schedule time each day for a bowel movement. Having a daily routine may help. Take your time and do not strain when having a bowel movement. · Check with your doctor before you increase the amount of fiber in your diet. For some people who have IBS, eating more fiber may make some symptoms worse. This includes bloating. To reduce diarrhea  You may try giving up foods or drinks one at a time to see whether symptoms improve. Limit or avoid the following:  · Alcohol  · Caffeine, which is found in coffee, tea, cola drinks, and chocolate  · Nicotine, from smoking or chewing tobacco  · Gas-producing foods, such as beans, broccoli, cabbage, and apples  · Dairy products that contain lactose (milk sugar), such as ice cream, milk, cheese, and sour cream  · Foods and drinks high in sugar, especially fruit juice, soda, candy, and other packaged sweets (such as cookies)  · Foods high in fat, including chilel, sausage, butter, oils, and anything deep-fried  · Sorbitol and xylitol, artificial sweeteners found in some sugarless candies and chewing gum  Keep track of foods  · Some people with IBS use a daily food diary to keep track of what they eat and whether they have any symptoms after eating certain foods. The diary also can be a good way to record what is going on in your life. · Stress plays a role in IBS. So if you are aware that certain stresses bring on symptoms, you can try to reduce those stresses. Keep mealtimes pleasant  · Try to maintain a pleasant environment when you eat. This may reduce stress that can make symptoms likely to occur. · Give yourself plenty of time to eat, rather than eating on the go. Chew your food slowly. Try not to swallow air, which can cause bloating. Where can you learn more? Go to http://ambrosio-hero.info/. Enter A272 in the search box to learn more about \"Diet for Irritable Bowel Syndrome: Care Instructions. \"  Current as of: May 12, 2017  Content Version: 11.4  © 9898-7559 [employer]. Care instructions adapted under license by [employer] (which disclaims liability or warranty for this information). If you have questions about a medical condition or this instruction, always ask your healthcare professional. [address] any warranty or liability for your use of this information.

## 2018-07-03 NOTE — LETTER
Ms. Jennyfer Perez [id number] [address] [address] Please administer the Shingrix vaccine in 2 doses  by 6 months. Please Fax confirmation to my attention at [phone]. Thank you.  
 
 
 
Sincerely, 
 
 
Raffi Bloom MD

## 2018-07-03 NOTE — PROGRESS NOTES
Reviewed record in preparation for visit and have necessary documentation  Pt did not bring medication to office visit for review  Goals that were addressed and/or need to be completed during or after this appointment include   Health Maintenance Due   Topic Date Due    BREAST CANCER SCRN MAMMOGRAM  07/13/2017    GLAUCOMA SCREENING Q2Y  05/18/2018    Pneumococcal 65+ Low/Medium Risk (2 of 2 - PPSV23) 05/18/2018

## 2018-07-03 NOTE — LETTER
7/6/2018 Ms. Natanael [place]ardo [place] [id number] [address] [address] Dear Natanael Gallardo Se: We were unable to reach you by telephone. You have a new anemia. Please stop by the lab and  a FIT test. This is a test that will check your stool for blood. Resulted Orders FOOD ALLERGY PROFILE Result Value Ref Range CLASS DESCRIPTION Comment Comment:  
       Levels of Specific IgE       Class  Description of Class 
    ---------------------------  -----  -------------------- 
                   < 0.10         0         Negative 0.10 -    0.31         0/I       Equivocal/Low 
           0.32 -    0.55         I         Low 
           0.56 -    1.40         II        Moderate 1.41 -    3.90         III       High 
           3.91 -   19.00         IV        Very High 
          19.01 -  100.00         V         Very High 
                  >100.00         VI        Very High Egg White <0.10 Class 0 kU/L Peanut, IgE <0.10 Class 0 kU/L Soybean <0.10 Class 0 kU/L Milk (Cow) <0.10 Class 0 kU/L Clam <0.10 Class 0 kU/L Shrimp <0.10 Class 0 kU/L Walnuts, IgE <0.10 Class 0 kU/L Codfish <0.10 Class 0 kU/L Scallops <0.10 Class 0 kU/L Wheat <0.10 Class 0 kU/L Corn <0.10 Class 0 kU/L Sesame Seed <0.10 Class 0 kU/L Narrative Performed at:  [address]  [id number] : Ryan Gomez MD, Phone:  [phone] METABOLIC PANEL, COMPREHENSIVE Result Value Ref Range Glucose 94 65 - 99 mg/dL BUN 14 8 - 27 mg/dL Creatinine 0.64 0.57 - 1.00 mg/dL GFR est non-AA 94 >59 mL/min/1.73 GFR est  >59 mL/min/1.73  
 BUN/Creatinine ratio 22 12 - 28 Sodium 141 134 - 144 mmol/L Potassium 4.5 3.5 - 5.2 mmol/L Chloride 102 96 - 106 mmol/L  
 CO2 27 20 - 29 mmol/L Comment:                  **Please note reference interval change**  
 Calcium 9.5 8.7 - 10.3 mg/dL Protein, total 6.5 6.0 - 8.5 g/dL Albumin 4.4 3.6 - 4.8 g/dL GLOBULIN, TOTAL 2.1 1.5 - 4.5 g/dL A-G Ratio 2.1 1.2 - 2.2 Bilirubin, total 0.6 0.0 - 1.2 mg/dL Alk. phosphatase 63 39 - 117 IU/L  
 AST (SGOT) 24 0 - 40 IU/L  
 ALT (SGPT) 21 0 - 32 IU/L Narrative Performed at:  [address]  [id number] : Martin Thomson MD, Phone:  [phone] LIPID PANEL Result Value Ref Range Cholesterol, total 148 100 - 199 mg/dL Triglyceride 58 0 - 149 mg/dL HDL Cholesterol 73 >39 mg/dL VLDL, calculated 12 5 - 40 mg/dL LDL, calculated 63 0 - 99 mg/dL Narrative Performed at:  [address]  [id number] : Martin Thomson MD, Phone:  [phone] CBC WITH AUTOMATED DIFF Result Value Ref Range WBC 4.0 3.4 - 10.8 x10E3/uL  
 RBC 4.13 3.77 - 5.28 x10E6/uL HGB 10.9 (L) 11.1 - 15.9 g/dL HCT 34.2 34.0 - 46.6 % MCV 83 79 - 97 fL  
 MCH 26.4 (L) 26.6 - 33.0 pg  
 MCHC 31.9 31.5 - 35.7 g/dL  
 RDW 15.9 (H) 12.3 - 15.4 % PLATELET 452 072 - 042 x10E3/uL NEUTROPHILS 60 Not Estab. % Lymphocytes 28 Not Estab. % MONOCYTES 10 Not Estab. % EOSINOPHILS 2 Not Estab. % BASOPHILS 0 Not Estab. %  
 ABS. NEUTROPHILS 2.4 1.4 - 7.0 x10E3/uL Abs Lymphocytes 1.1 0.7 - 3.1 x10E3/uL  
 ABS. MONOCYTES 0.4 0.1 - 0.9 x10E3/uL  
 ABS. EOSINOPHILS 0.1 0.0 - 0.4 x10E3/uL  
 ABS. BASOPHILS 0.0 0.0 - 0.2 x10E3/uL IMMATURE GRANULOCYTES 0 Not Estab. %  
 ABS. IMM. GRANS. 0.0 0.0 - 0.1 x10E3/uL Narrative Performed at:  [address]  [id number] : Martin Thomson MD, Phone:  [phone] Please call me if you have any questions: [phone] Sincerely, LE[facility]ST[facility]

## 2018-07-03 NOTE — MR AVS SNAPSHOT
Alok Greenberg 
 
 
 [address] [address] 
[phone] Patient: Elsie Neal MRN: [id number] OIV:8/50/[birth year]502 Visit Information Date & Time Provider Department Dept. Phone Encounter #  
 7/3/2018  8:00 AM Yaamarisela Dennis, [address] [id number] Follow-up Instructions Return in about 6 months (around 1/3/2019). Upcoming Health Maintenance Date Due  
 BREAST CANCER SCRN MAMMOGRAM 7/13/2017 GLAUCOMA SCREENING Q2Y 5/18/2018 Pneumococcal 65+ Low/Medium Risk (2 of 2 - PPSV23) 5/18/2018 Influenza Age 5 to Adult 8/1/2018 DTaP/Tdap/Td series (2 - Td) 9/19/2022 COLONOSCOPY 9/23/2024 Allergies as of 7/3/2018  Review Complete On: 7/3/2018 By: Mariano Marion No Known Allergies Current Immunizations  Reviewed on 5/24/2013 Name Date Influenza Vaccine Split 10/4/2012 Influenza Vaccine Whole 11/7/2010 Lyme Disease Vaccine  Incomplete TD Vaccine 2/7/2011 TDAP Vaccine 9/19/2012 ZZZ-RETIRED (DO NOT USE) Pneumococcal Vaccine (Unspecified Type) 3/7/2011 Zoster Vaccine, Live 3/16/2017 Not reviewed this visit You Were Diagnosed With   
  
 Codes Comments Irritable bowel syndrome without diarrhea    -  Primary ICD-10-CM: K58.9 ICD-9-CM: 784.8 Food sensitivity with gastrointestinal symptoms     ICD-10-CM: R19.8 ICD-9-CM: 787.99 Elevated cholesterol     ICD-10-CM: E78.00 ICD-9-CM: 272.0 Vitals BP Pulse Temp Resp Height(growth percentile) Weight(growth percentile) 131/79 (BP 1 Location: Right arm, BP Patient Position: Sitting) 61 97.7 °F (36.5 °C) (Oral) (!) 61 5' 8\" (1.727 m) 146 lb (66.2 kg) SpO2 BMI OB Status Smoking Status 100% 22.2 kg/m2 Postmenopausal Never Smoker Vitals History BMI and BSA Data Body Mass Index Body Surface Area  
 22.2 kg/m 2 1.78 m 2 Preferred Pharmacy Pharmacy Name Phone [facility] [address] [place] [address] [address]  [phone] Your Updated Medication List  
  
   
This list is accurate as of 7/3/18  8:26 AM.  Always use your most recent med list.  
  
  
  
  
 digestive enzymes Tab Take  by mouth. VITAMIN C 500 mg tablet Generic drug:  ascorbic acid (vitamin C) Take 1,500 mg by mouth. We Performed the Following CBC WITH AUTOMATED DIFF [61096 CPT(R)] FOOD ALLERGY PROFILE [91854 CPT(R)] LIPID PANEL [69809 CPT(R)] METABOLIC PANEL, COMPREHENSIVE [76144 CPT(R)] Follow-up Instructions Return in about 6 months (around 1/3/2019). Patient Instructions Diet for Irritable Bowel Syndrome: Care Instructions Your Care Instructions Irritable bowel syndrome, or IBS, is a problem with the intestines. IBS can cause belly pain, bloating, gas, constipation, and diarrhea. Most people can control their symptoms by changing their diet and easing stress. No specific foods cause everyone with IBS to have symptoms. Doctors don't offer a specific diet to manage symptoms. But many people find that they feel better when they stop eating certain foods. A high-fiber diet may help if you have constipation. Follow-up care is a key part of your treatment and safety. Be sure to make and go to all appointments, and call your doctor if you are having problems. It's also a good idea to know your test results and keep a list of the medicines you take. How can you care for yourself at home? To reduce constipation · Include fruits, vegetables, beans, and whole grains in your diet each day. These foods are high in fiber. Slowly increase the amount of fiber you eat. This helps you avoid a lot of gas. · Drink plenty of fluids, enough so that your urine is light yellow or clear like water.  If you have kidney, heart, or liver disease and have to limit fluids, talk with your doctor before you increase the amount of fluids you drink. · Get some exercise every day. Build up slowly to 30 to 60 minutes a day on 5 or more days of the week. · Take a fiber supplement, such as Citrucel or Metamucil, every day if needed. Read and follow all instructions on the label. · Schedule time each day for a bowel movement. Having a daily routine may help. Take your time and do not strain when having a bowel movement. · Check with your doctor before you increase the amount of fiber in your diet. For some people who have IBS, eating more fiber may make some symptoms worse. This includes bloating. To reduce diarrhea You may try giving up foods or drinks one at a time to see whether symptoms improve. Limit or avoid the following: · Alcohol · Caffeine, which is found in coffee, tea, cola drinks, and chocolate · Nicotine, from smoking or chewing tobacco 
· Gas-producing foods, such as beans, broccoli, cabbage, and apples · Dairy products that contain lactose (milk sugar), such as ice cream, milk, cheese, and sour cream 
· Foods and drinks high in sugar, especially fruit juice, soda, candy, and other packaged sweets (such as cookies) · Foods high in fat, including chilel, sausage, butter, oils, and anything deep-fried · Sorbitol and xylitol, artificial sweeteners found in some sugarless candies and chewing gum Keep track of foods · Some people with IBS use a daily food diary to keep track of what they eat and whether they have any symptoms after eating certain foods. The diary also can be a good way to record what is going on in your life. · Stress plays a role in IBS. So if you are aware that certain stresses bring on symptoms, you can try to reduce those stresses. Keep mealtimes pleasant · Try to maintain a pleasant environment when you eat. This may reduce stress that can make symptoms likely to occur. · Give yourself plenty of time to eat, rather than eating on the go. Chew your food slowly. Try not to swallow air, which can cause bloating. Where can you learn more? Go to http://ambrosio-hero.info/. Enter F428 in the search box to learn more about \"Diet for Irritable Bowel Syndrome: Care Instructions. \" Current as of: May 12, 2017 Content Version: 11.4 © 1716-9386 [employer]. Care instructions adapted under license by [employer] (which disclaims liability or warranty for this information). If you have questions about a medical condition or this instruction, always ask your healthcare professional. [place] [address] any warranty or liability for your use of this information. Introducing [facility]! Dear Jordy Mendez: 
Thank you for requesting a [employer] account. Our records indicate that you already have an active [employer] account. You can access your account anytime at [email or web address] Did you know that you can access your hospital and ER discharge instructions at any time in [employer]? You can also review all of your test results from your hospital stay or ER visit. Additional Information If you have questions, please visit the Frequently Asked Questions section of the [employer] website at [email or web address] Remember, [employer] is NOT to be used for urgent needs. For medical emergencies, dial 911. Now available from your iPhone and Android! Please provide this summary of care documentation to your next provider. Your primary care clinician is listed as [address]. If you have any questions after today's visit, please call [phone].

## 2018-07-05 ENCOUNTER — TELEPHONE (OUTPATIENT)
Dept: FAMILY MEDICINE CLINIC | Age: 63
End: 2018-07-05

## 2018-07-05 LAB
ALBUMIN SERPL-MCNC: 4.4 G/DL (ref 3.6–4.8)
ALBUMIN/GLOB SERPL: 2.1 {RATIO} (ref 1.2–2.2)
ALP SERPL-CCNC: 63 IU/L (ref 39–117)
ALT SERPL-CCNC: 21 IU/L (ref 0–32)
AST SERPL-CCNC: 24 IU/L (ref 0–40)
BASOPHILS # BLD AUTO: 0 X10E3/UL (ref 0–0.2)
BASOPHILS NFR BLD AUTO: 0 %
BILIRUB SERPL-MCNC: 0.6 MG/DL (ref 0–1.2)
BUN SERPL-MCNC: 14 MG/DL (ref 8–27)
BUN/CREAT SERPL: 22 (ref 12–28)
CALCIUM SERPL-MCNC: 9.5 MG/DL (ref 8.7–10.3)
CHLORIDE SERPL-SCNC: 102 MMOL/L (ref 96–106)
CHOLEST SERPL-MCNC: 148 MG/DL (ref 100–199)
CLAM IGE QN: <0.1 KU/L
CO2 SERPL-SCNC: 27 MMOL/L (ref 20–29)
CODFISH IGE QN: <0.1 KU/L
CORN IGE QN: <0.1 KU/L
COW MILK IGE QN: <0.1 KU/L
CREAT SERPL-MCNC: 0.64 MG/DL (ref 0.57–1)
EGG WHITE IGE QN: <0.1 KU/L
EOSINOPHIL # BLD AUTO: 0.1 X10E3/UL (ref 0–0.4)
EOSINOPHIL NFR BLD AUTO: 2 %
ERYTHROCYTE [DISTWIDTH] IN BLOOD BY AUTOMATED COUNT: 15.9 % (ref 12.3–15.4)
GLOBULIN SER CALC-MCNC: 2.1 G/DL (ref 1.5–4.5)
GLUCOSE SERPL-MCNC: 94 MG/DL (ref 65–99)
HCT VFR BLD AUTO: 34.2 % (ref 34–46.6)
HDLC SERPL-MCNC: 73 MG/DL
HGB BLD-MCNC: 10.9 G/DL (ref 11.1–15.9)
IMM GRANULOCYTES # BLD: 0 X10E3/UL (ref 0–0.1)
IMM GRANULOCYTES NFR BLD: 0 %
LDLC SERPL CALC-MCNC: 63 MG/DL (ref 0–99)
LYMPHOCYTES # BLD AUTO: 1.1 X10E3/UL (ref 0.7–3.1)
LYMPHOCYTES NFR BLD AUTO: 28 %
Lab: NORMAL
MCH RBC QN AUTO: 26.4 PG (ref 26.6–33)
MCHC RBC AUTO-ENTMCNC: 31.9 G/DL (ref 31.5–35.7)
MCV RBC AUTO: 83 FL (ref 79–97)
MONOCYTES # BLD AUTO: 0.4 X10E3/UL (ref 0.1–0.9)
MONOCYTES NFR BLD AUTO: 10 %
NEUTROPHILS # BLD AUTO: 2.4 X10E3/UL (ref 1.4–7)
NEUTROPHILS NFR BLD AUTO: 60 %
PEANUT IGE QN: <0.1 KU/L
PLATELET # BLD AUTO: 221 X10E3/UL (ref 150–379)
POTASSIUM SERPL-SCNC: 4.5 MMOL/L (ref 3.5–5.2)
PROT SERPL-MCNC: 6.5 G/DL (ref 6–8.5)
RBC # BLD AUTO: 4.13 X10E6/UL (ref 3.77–5.28)
SCALLOP IGE QN: <0.1 KU/L
SESAME SEED IGE QN: <0.1 KU/L
SHRIMP IGE QN: <0.1 KU/L
SODIUM SERPL-SCNC: 141 MMOL/L (ref 134–144)
SOYBEAN IGE QN: <0.1 KU/L
TRIGL SERPL-MCNC: 58 MG/DL (ref 0–149)
VLDLC SERPL CALC-MCNC: 12 MG/DL (ref 5–40)
WALNUT IGE QN: <0.1 KU/L
WBC # BLD AUTO: 4 X10E3/UL (ref 3.4–10.8)
WHEAT IGE QN: <0.1 KU/L

## 2018-07-05 NOTE — TELEPHONE ENCOUNTER
----- Message from Bridget Martínez MD sent at 7/5/2018  8:15 AM EDT -----  She has a new anemia.   Please add an iron profile and also get her to submit an FIT test.  Thank you,  Dr. Jimenez Miss

## 2018-07-05 NOTE — TELEPHONE ENCOUNTER
Called patient. Unsuccessful. Voicemail left. Need to advised patient per Dr. Salbador Garrido:    She has a new anemia.  Please add an iron profile and also get her to submit an FIT test.         Lab was added on by calling [employer] and adding test code. She needs to come in to lab and obtain a FIT test to test her stools for blood.

## 2018-07-05 NOTE — PROGRESS NOTES
She has a new anemia.   Please add an iron profile and also get her to submit an FIT test.  Thank you,  Dr. Angela Huffman

## 2018-07-06 NOTE — TELEPHONE ENCOUNTER
----- Message from Brent Moseley sent at 7/6/2018  7:55 AM EDT -----  Regarding: Dr. Sinai Negro returned call from the office on yesterday.   Best contact number [phone](call between 10:30 a.m.-1:00 p.m.)

## 2018-07-08 DIAGNOSIS — D64.9 ANEMIA, UNSPECIFIED TYPE: Primary | Chronic | ICD-10-CM

## 2018-07-09 ENCOUNTER — TELEPHONE (OUTPATIENT)
Dept: FAMILY MEDICINE CLINIC | Age: 63
End: 2018-07-09

## 2018-07-09 LAB
IRON SATN MFR SERPL: 28 % (ref 15–55)
IRON SERPL-MCNC: 121 UG/DL (ref 27–139)
SPECIMEN STATUS REPORT, ROLRST: NORMAL
TIBC SERPL-MCNC: 426 UG/DL (ref 250–450)
UIBC SERPL-MCNC: 305 UG/DL (ref 118–369)

## 2018-07-09 NOTE — TELEPHONE ENCOUNTER
Pt returned call. She was made aware of note by Dr. Tony Mars. She states that she does not have a specific time she can come by for labs, but she will come sometime around the week of 7/23.

## 2018-07-09 NOTE — TELEPHONE ENCOUNTER
Called patient. No Answer on home #. Voicemail left on mobile #. Need to advise per Dr. Jose aMtson:      *With exception of a new anemia your labs are acceptable. Your iron level was normal so I need to repeat your blood count in 2 weeks. Please make a lab appointment.  *

## 2018-08-06 LAB
BASOPHILS # BLD AUTO: 0 X10E3/UL (ref 0–0.2)
BASOPHILS NFR BLD AUTO: 0 %
EOSINOPHIL # BLD AUTO: 0.1 X10E3/UL (ref 0–0.4)
EOSINOPHIL NFR BLD AUTO: 2 %
ERYTHROCYTE [DISTWIDTH] IN BLOOD BY AUTOMATED COUNT: 14.8 % (ref 12.3–15.4)
FERRITIN SERPL-MCNC: 9 NG/ML (ref 15–150)
HCT VFR BLD AUTO: 35.4 % (ref 34–46.6)
HGB BLD-MCNC: 11.8 G/DL (ref 11.1–15.9)
IMM GRANULOCYTES # BLD: 0 X10E3/UL (ref 0–0.1)
IMM GRANULOCYTES NFR BLD: 0 %
LYMPHOCYTES # BLD AUTO: 1.1 X10E3/UL (ref 0.7–3.1)
LYMPHOCYTES NFR BLD AUTO: 33 %
MCH RBC QN AUTO: 27.6 PG (ref 26.6–33)
MCHC RBC AUTO-ENTMCNC: 33.3 G/DL (ref 31.5–35.7)
MCV RBC AUTO: 83 FL (ref 79–97)
MONOCYTES # BLD AUTO: 0.4 X10E3/UL (ref 0.1–0.9)
MONOCYTES NFR BLD AUTO: 11 %
NEUTROPHILS # BLD AUTO: 1.8 X10E3/UL (ref 1.4–7)
NEUTROPHILS NFR BLD AUTO: 54 %
PATH REV BLD -IMP: ABNORMAL
PATHOLOGIST NAME: ABNORMAL
PLATELET # BLD AUTO: 255 X10E3/UL (ref 150–379)
RBC # BLD AUTO: 4.27 X10E6/UL (ref 3.77–5.28)
RETICS/RBC NFR AUTO: 0.6 % (ref 0.6–2.6)
WBC # BLD AUTO: 3.3 X10E3/UL (ref 3.4–10.8)

## 2018-08-18 ENCOUNTER — HOSPITAL ENCOUNTER (OUTPATIENT)
Dept: MAMMOGRAPHY | Age: 63
Discharge: HOME | End: 2018-08-18
Attending: FAMILY MEDICINE
Payer: COMMERCIAL

## 2018-08-18 DIAGNOSIS — Z12.31 VISIT FOR SCREENING MAMMOGRAM: ICD-10-CM

## 2018-08-18 PROCEDURE — 77067 SCR MAMMO BI INCL CAD: CPT

## 2018-10-16 ENCOUNTER — OFFICE VISIT (OUTPATIENT)
Dept: FAMILY MEDICINE CLINIC | Age: 63
End: 2018-10-16

## 2018-10-16 VITALS
SYSTOLIC BLOOD PRESSURE: 144 MMHG | RESPIRATION RATE: 16 BRPM | HEART RATE: 64 BPM | OXYGEN SATURATION: 100 % | WEIGHT: 145 LBS | DIASTOLIC BLOOD PRESSURE: 73 MMHG | BODY MASS INDEX: 21.98 KG/M2 | TEMPERATURE: 98.3 F | HEIGHT: 68 IN

## 2018-10-16 DIAGNOSIS — D50.8 IRON DEFICIENCY ANEMIA SECONDARY TO INADEQUATE DIETARY IRON INTAKE: ICD-10-CM

## 2018-10-16 DIAGNOSIS — R03.0 ELEVATED BP WITHOUT DIAGNOSIS OF HYPERTENSION: ICD-10-CM

## 2018-10-16 DIAGNOSIS — I65.23 BILATERAL CAROTID ARTERY STENOSIS: ICD-10-CM

## 2018-10-16 DIAGNOSIS — K58.9 IRRITABLE BOWEL SYNDROME WITHOUT DIARRHEA: Chronic | ICD-10-CM

## 2018-10-16 DIAGNOSIS — J06.9 VIRAL UPPER RESPIRATORY TRACT INFECTION: ICD-10-CM

## 2018-10-16 DIAGNOSIS — Z00.00 ENCOUNTER FOR WELLNESS EXAMINATION IN ADULT: Primary | ICD-10-CM

## 2018-10-16 NOTE — PROGRESS NOTES
Progress Note    Patient: Patricio Kohler MRN: [id number]  SSN: [id number]    YOB: 1955  Age: 70 y.o. Sex: female        Chief Complaint   Patient presents with    Labs     fasting    Physical         Subjective:     Encounter Diagnoses   Name Primary?  Encounter for wellness examination in adult Yes    Irritable bowel syndrome without diarrhea     Viral upper respiratory tract infection     Bilateral carotid artery stenosis     Iron deficiency anemia secondary to inadequate dietary iron intake     Elevated BP without diagnosis of hypertension              Current and past medical information:    Current Medications after this visit[de-identified]     Current Outpatient Prescriptions   Medication Sig    L. acidophilus/Strept/La p-talita (NATE-Q PO) Take  by mouth.  varicella-zoster recombinant, PF, (SHINGRIX, PF,) 50 mcg/0.5 mL susr injection 0.5 mL by IntraMUSCular route once for 1 dose. Please administer the Shingrix vaccine in 2 doses  by 6 months. Please Fax confirmation to my attention at [phone]. Thank you. Indications: PREVENTION OF HERPES ZOSTER    digestive enzymes tab Take  by mouth.  ascorbic acid, vitamin C, (VITAMIN C) 500 mg tablet Take 1,500 mg by mouth. No current facility-administered medications for this visit.         Patient Active Problem List    Diagnosis Date Noted    Anemia 07/08/2018    IBS (irritable bowel syndrome) 03/22/2012    Chest discomfort 05/03/2011       Past Medical History:   Diagnosis Date    Anemia NEC     IBS (irritable bowel syndrome)        No Known Allergies    Past Surgical History:   Procedure Laterality Date    DILATION AND CURETTAGE      HX GYN  1979    D&C       Social History     Social History    Marital status:      Spouse name: N/A    Number of children: N/A    Years of education: N/A     Social History Main Topics    Smoking status: Never Smoker    Smokeless tobacco: Never Used    Alcohol use No    Drug use: No    Sexual activity: Not Asked     Other Topics Concern    None     Social History Narrative     Review of Systems   Constitutional: Negative. Negative for chills, fever, malaise/fatigue and weight loss. HENT: Positive for congestion. Negative for hearing loss. Eyes: Negative. Negative for blurred vision and double vision. Respiratory: Positive for cough. Negative for hemoptysis, sputum production and shortness of breath. Cardiovascular: Negative. Negative for chest pain, palpitations and orthopnea. Gastrointestinal: Negative. Negative for abdominal pain, blood in stool, heartburn, nausea and vomiting. Genitourinary: Negative. Negative for dysuria, frequency and urgency. Musculoskeletal: Negative. Negative for back pain, myalgias and neck pain. Skin: Negative. Negative for rash. Neurological: Negative. Negative for dizziness, tingling, tremors, weakness and headaches. Endo/Heme/Allergies: Negative. Psychiatric/Behavioral: Negative. Negative for depression. Objective:     Vitals:    10/16/18 0906 10/16/18 0946   BP: 150/72 144/73   Pulse: 64    Resp: 16    Temp: 98.3 °F (36.8 °C)    TempSrc: Oral    SpO2: 100%    Weight: 145 lb (65.8 kg)    Height: 5' 8\" (1.727 m)       Body mass index is 22.05 kg/(m^2). Physical Exam   Constitutional: She is oriented to person, place, and time and well-developed, well-nourished, and in no distress. No distress. HENT:   Head: Normocephalic and atraumatic. Mouth/Throat: Oropharynx is clear and moist.   Eyes: Conjunctivae are normal.   Neck: No thyromegaly present. Cardiovascular: Normal rate, regular rhythm and normal heart sounds. No murmur heard. Pulmonary/Chest: Effort normal. No respiratory distress. few scattered rhonchi. Abdominal: Soft. She exhibits no distension. Neurological: She is alert and oriented to person, place, and time. Skin: Skin is warm. No rash noted. She is not diaphoretic. No erythema. Psychiatric: Mood and affect normal.   Nursing note and vitals reviewed. Health Maintenance Due   Topic Date Due    Shingrix Vaccine Age 49> (1 of 2) 05/18/2003    GLAUCOMA SCREENING Q2Y  05/18/2018         Assessment and orders:     Encounter Diagnoses     ICD-10-CM ICD-9-CM   1. Encounter for wellness examination in adult Z00.00 V70.0   2. Irritable bowel syndrome without diarrhea K58.9 564.1   3. Viral upper respiratory tract infection J06.9 465.9   4. Bilateral carotid artery stenosis I65.23 433.10     433.30   5. Iron deficiency anemia secondary to inadequate dietary iron intake D50.8 280.1   6. Elevated BP without diagnosis of hypertension R03.0 796.2     Diagnoses and all orders for this visit:    1. Encounter for wellness examination in adult  -     METABOLIC PANEL, COMPREHENSIVE  -     LIPID PANEL    2. Irritable bowel syndrome without diarrhea- await Dr Sheri Banuelos results. 3. Viral upper respiratory tract infection-this is finally resolving. She ran a fever in the early stages but is now better. She will get her flu shot at work after she gets over her viral respiratory infection. 4. Bilateral carotid artery stenosis-even though I do not have the records yet Dr. Damari Asif says that her carotid artery walls were thickened. I have requested his records. 5. Iron deficiency anemia secondary to inadequate dietary iron intake-she said lab work done at Dr. Dudley Hoyos office was improved. I have requested labs. 6. Elevated BP without diagnosis of hypertension-unusual elevation of blood pressure today. It may be because she is still sick with a viral respiratory infection. Other orders  -     varicella-zoster recombinant, PF, (SHINGRIX, PF,) 50 mcg/0.5 mL susr injection; 0.5 mL by IntraMUSCular route once for 1 dose. Please administer the Shingrix vaccine in 2 doses  by 6 months. Please Fax confirmation to my attention at [phone]. Thank you.   Indications: PREVENTION OF HERPES ZOSTER      Follow-up Disposition:  Return in about 6 months (around 4/16/2019). Plan of care:  Discussed diagnoses in detail with patient. Medication risks/benefits/side effects discussed with patient. All of the patient's questions were addressed. The patient understands and agrees with our plan of care. The patient knows to call back if they are unsure of or forget any changes we discussed today or if the symptoms change. The patient received an After-Visit Summary which contains VS, orders, medication list and allergy list. This can be used as a \"mini-medical record\" should they have to seek medical care while out of town. Patient Care Team:  Ham Good MD as PCP - Annelise Francois MD (Dermatology)  Hoda Disla RN as [address] [place] [address]  Yesenia Newman MD (Rheumatology)    Follow-up Disposition:  Return in about 6 months (around 4/16/2019). No future appointments.     Signed By: Ham Good MD     October 16, 2018

## 2018-10-16 NOTE — MR AVS SNAPSHOT
[address] [address] 
[phone] Patient: Elias Khan MRN: [id number] Visit Information Date & Time Provider Department Dept. Phone Encounter #  
 10/16/2018  9:00 AM Lord John MD  [place] [id number] Follow-up Instructions Return in about 6 months (around 4/16/2019). Upcoming Health Maintenance Date Due Shingrix Vaccine Age 50> (1 of 2) 5/18/2003 GLAUCOMA SCREENING Q2Y 5/18/2018 Influenza Age 5 to Adult 10/18/2019* Pneumococcal 65+ Low/Medium Risk (2 of 2 - PPSV23) 8/28/2019 BREAST CANCER SCRN MAMMOGRAM 8/18/2020 DTaP/Tdap/Td series (2 - Td) 9/19/2022 COLONOSCOPY 9/23/2024 *Topic was postponed. The date shown is not the original due date. Allergies as of 10/16/2018  Review Complete On: 7/3/2018 By: Lord John MD  
 No Known Allergies Current Immunizations  Reviewed on 5/24/2013 Name Date Influenza Vaccine Split 10/4/2012 Influenza Vaccine Whole 11/7/2010 Lyme Disease Vaccine  Incomplete Pneumococcal Conjugate (PCV-13) 8/28/2018 TD Vaccine 2/7/2011 TDAP Vaccine 9/19/2012 ZZZ-RETIRED (DO NOT USE) Pneumococcal Vaccine (Unspecified Type) 3/7/2011 Zoster Vaccine, Live 3/16/2017 Not reviewed this visit You Were Diagnosed With   
  
 Codes Comments Encounter for wellness examination in adult    -  Primary ICD-10-CM: Z00.00 ICD-9-CM: V70.0 Irritable bowel syndrome without diarrhea     ICD-10-CM: K58.9 ICD-9-CM: 248.5 Viral upper respiratory tract infection     ICD-10-CM: J06.9 ICD-9-CM: 465.9 Bilateral carotid artery stenosis     ICD-10-CM: I65.23 ICD-9-CM: 433.10, 433.30 Iron deficiency anemia secondary to inadequate dietary iron intake     ICD-10-CM: D50.8 ICD-9-CM: 280.1 Elevated BP without diagnosis of hypertension     ICD-10-CM: R03.0 ICD-9-CM: 796.2 Vitals BP Pulse Temp Resp Height(growth percentile) Weight(growth percentile) 150/72 64 98.3 °F (36.8 °C) (Oral) 16 5' 8\" (1.727 m) 145 lb (65.8 kg) SpO2 BMI OB Status Smoking Status 100% 22.05 kg/m2 Postmenopausal Never Smoker Vitals History BMI and BSA Data Body Mass Index Body Surface Area 22.05 kg/m 2 1.78 m 2 Preferred Pharmacy Pharmacy Name Phone [address] [facility] [address] [place] [address] [phone] Your Updated Medication List  
  
   
This list is accurate as of 10/16/18  9:41 AM.  Always use your most recent med list.  
  
  
  
  
 digestive enzymes Tab Take  by mouth. NATE-Q PO Take  by mouth.  
  
 varicella-zoster recombinant (PF) 50 mcg/0.5 mL Susr injection Commonly known as:  SHINGRIX (PF)  
0.5 mL by IntraMUSCular route once for 1 dose. Please administer the Shingrix vaccine in 2 doses  by 6 months. Please Fax confirmation to my attention at [phone]. Thank you. Indications: PREVENTION OF HERPES ZOSTER  
  
 VITAMIN C 500 mg tablet Generic drug:  ascorbic acid (vitamin C) Take 1,500 mg by mouth. Prescriptions Printed Refills  
 varicella-zoster recombinant, PF, (SHINGRIX, PF,) 50 mcg/0.5 mL susr injection 0 Si.5 mL by IntraMUSCular route once for 1 dose. Please administer the Shingrix vaccine in 2 doses  by 6 months. Please Fax confirmation to my attention at [phone]. Thank you. Indications: PREVENTION OF HERPES ZOSTER Class: Print Route: IntraMUSCular We Performed the Following LIPID PANEL [87987 CPT(R)] METABOLIC PANEL, COMPREHENSIVE [01854 CPT(R)] Follow-up Instructions Return in about 6 months (around 2019). Patient Instructions Well Visit, Women 48 to 72: Care Instructions Your Care Instructions Physical exams can help you stay healthy.  Your doctor has checked your overall health and may have suggested ways to take good care of yourself. He or she also may have recommended tests. At home, you can help prevent illness with healthy eating, regular exercise, and other steps. Follow-up care is a key part of your treatment and safety. Be sure to make and go to all appointments, and call your doctor if you are having problems. It's also a good idea to know your test results and keep a list of the medicines you take. How can you care for yourself at home? · Reach and stay at a healthy weight. This will lower your risk for many problems, such as obesity, diabetes, heart disease, and high blood pressure. · Get at least 30 minutes of exercise on most days of the week. Walking is a good choice. You also may want to do other activities, such as running, swimming, cycling, or playing tennis or team sports. · Do not smoke. Smoking can make health problems worse. If you need help quitting, talk to your doctor about stop-smoking programs and medicines. These can increase your chances of quitting for good. · Protect your skin from too much sun. When you're outdoors from 10 a.m. to 4 p.m., stay in the shade or cover up with clothing and a hat with a wide brim. Wear sunglasses that block UV rays. Even when it's cloudy, put broad-spectrum sunscreen (SPF 30 or higher) on any exposed skin. · See a dentist one or two times a year for checkups and to have your teeth cleaned. · Wear a seat belt in the car. · Limit alcohol to 1 drink a day. Too much alcohol can cause health problems. Follow your doctor's advice about when to have certain tests. These tests can spot problems early. · Cholesterol. Your doctor will tell you how often to have this done based on your age, family history, or other things that can increase your risk for heart attack and stroke. · Blood pressure.  Have your blood pressure checked during a routine doctor visit. Your doctor will tell you how often to check your blood pressure based on your age, your blood pressure results, and other factors. · Mammogram. Ask your doctor how often you should have a mammogram, which is an X-ray of your breasts. A mammogram can spot breast cancer before it can be felt and when it is easiest to treat. · Pap test and pelvic exam. Ask your doctor how often you should have a Pap test. You may not need to have a Pap test as often as you used to. · Vision. Have your eyes checked every year or two or as often as your doctor suggests. Some experts recommend that you have yearly exams for glaucoma and other age-related eye problems starting at age 48. · Hearing. Tell your doctor if you notice any change in your hearing. You can have tests to find out how well you hear. · Diabetes. Ask your doctor whether you should have tests for diabetes. · Colon cancer. You should begin tests for colon cancer at age 48. You may have one of several tests. Your doctor will tell you how often to have tests based on your age and risk. Risks include whether you already had a precancerous polyp removed from your colon or whether your parents, sisters and brothers, or children have had colon cancer. · Thyroid disease. Talk to your doctor about whether to have your thyroid checked as part of a regular physical exam. Women have an increased chance of a thyroid problem. · Osteoporosis. You should begin tests for bone density at age 72. If you are younger than 72, ask your doctor whether you have factors that may increase your risk for this disease. You may want to have this test before age 72. · Heart attack and stroke risk. At least every 4 to 6 years, you should have your risk for heart attack and stroke assessed. Your doctor uses factors such as your age, blood pressure, cholesterol, and whether you smoke or have diabetes to show what your risk for a heart attack or stroke is over the next 10 years. When should you call for help? Watch closely for changes in your health, and be sure to contact your doctor if you have any problems or symptoms that concern you. Where can you learn more? Go to http://ambrosio-hero.info/. Enter L360 in the search box to learn more about \"Well Visit, Women 50 to 72: Care Instructions. \" Current as of: March 29, 2018 Content Version: 11.8 © 0130-1557 [employer]. Care instructions adapted under license by Reflex (which disclaims liability or warranty for this information). If you have questions about a medical condition or this instruction, always ask your healthcare professional. [address] any warranty or liability for your use of this information. Introducing [facility]! Dear Enrique Leventhal: 
Thank you for requesting a [facility] account. Our records indicate that you already have an active [facility] account. You can access your account anytime at [email or web address] Did you know that you can access your hospital and ER discharge instructions at any time in [facility]? You can also review all of your test results from your hospital stay or ER visit. Additional Information If you have questions, please visit the Frequently Asked Questions section of the [facility] website at [email or web address] Remember, [facility] is NOT to be used for urgent needs. For medical emergencies, dial 911. Now available from your iPhone and Android! Please provide this summary of care documentation to your next provider. Your primary care clinician is listed as [address]. If you have any questions after today's visit, please call [phone].

## 2018-10-16 NOTE — PATIENT INSTRUCTIONS
Well Visit, Women 48 to 72: Care Instructions  Your Care Instructions    Physical exams can help you stay healthy. Your doctor has checked your overall health and may have suggested ways to take good care of yourself. He or she also may have recommended tests. At home, you can help prevent illness with healthy eating, regular exercise, and other steps. Follow-up care is a key part of your treatment and safety. Be sure to make and go to all appointments, and call your doctor if you are having problems. It's also a good idea to know your test results and keep a list of the medicines you take. How can you care for yourself at home? · Reach and stay at a healthy weight. This will lower your risk for many problems, such as obesity, diabetes, heart disease, and high blood pressure. · Get at least 30 minutes of exercise on most days of the week. Walking is a good choice. You also may want to do other activities, such as running, swimming, cycling, or playing tennis or team sports. · Do not smoke. Smoking can make health problems worse. If you need help quitting, talk to your doctor about stop-smoking programs and medicines. These can increase your chances of quitting for good. · Protect your skin from too much sun. When you're outdoors from 10 a.m. to 4 p.m., stay in the shade or cover up with clothing and a hat with a wide brim. Wear sunglasses that block UV rays. Even when it's cloudy, put broad-spectrum sunscreen (SPF 30 or higher) on any exposed skin. · See a dentist one or two times a year for checkups and to have your teeth cleaned. · Wear a seat belt in the car. · Limit alcohol to 1 drink a day. Too much alcohol can cause health problems. Follow your doctor's advice about when to have certain tests. These tests can spot problems early. · Cholesterol.  Your doctor will tell you how often to have this done based on your age, family history, or other things that can increase your risk for heart attack and stroke. · Blood pressure. Have your blood pressure checked during a routine doctor visit. Your doctor will tell you how often to check your blood pressure based on your age, your blood pressure results, and other factors. · Mammogram. Ask your doctor how often you should have a mammogram, which is an X-ray of your breasts. A mammogram can spot breast cancer before it can be felt and when it is easiest to treat. · Pap test and pelvic exam. Ask your doctor how often you should have a Pap test. You may not need to have a Pap test as often as you used to. · Vision. Have your eyes checked every year or two or as often as your doctor suggests. Some experts recommend that you have yearly exams for glaucoma and other age-related eye problems starting at age 48. · Hearing. Tell your doctor if you notice any change in your hearing. You can have tests to find out how well you hear. · Diabetes. Ask your doctor whether you should have tests for diabetes. · Colon cancer. You should begin tests for colon cancer at age 48. You may have one of several tests. Your doctor will tell you how often to have tests based on your age and risk. Risks include whether you already had a precancerous polyp removed from your colon or whether your parents, sisters and brothers, or children have had colon cancer. · Thyroid disease. Talk to your doctor about whether to have your thyroid checked as part of a regular physical exam. Women have an increased chance of a thyroid problem. · Osteoporosis. You should begin tests for bone density at age 72. If you are younger than 72, ask your doctor whether you have factors that may increase your risk for this disease. You may want to have this test before age 72. · Heart attack and stroke risk. At least every 4 to 6 years, you should have your risk for heart attack and stroke assessed.  Your doctor uses factors such as your age, blood pressure, cholesterol, and whether you smoke or have diabetes to show what your risk for a heart attack or stroke is over the next 10 years. When should you call for help? Watch closely for changes in your health, and be sure to contact your doctor if you have any problems or symptoms that concern you. Where can you learn more? Go to http://ambrosio-hero.info/. Enter S726 in the search box to learn more about \"Well Visit, Women 50 to 72: Care Instructions. \"  Current as of: March 29, 2018  Content Version: 11.8  © 4241-2326 [employer]wise, Incorporated. Care instructions adapted under license by [employer] (which disclaims liability or warranty for this information). If you have questions about a medical condition or this instruction, always ask your healthcare professional. [address] any warranty or liability for your use of this information.

## 2018-10-16 NOTE — LETTER
10/17/2018 10:48 AM 
 
Ms. Natanael [place] [address] [address] [address] Dear Natanael Gallardo Se: 
 
Please find your most recent results below. Your labs are normal. Continue the current treatment plan and keep up the good work. Resulted Orders METABOLIC PANEL, COMPREHENSIVE Result Value Ref Range Glucose 92 65 - 99 mg/dL BUN 10 8 - 27 mg/dL Creatinine 0.67 0.57 - 1.00 mg/dL GFR est non-AA 93 >59 mL/min/1.73 GFR est  >59 mL/min/1.73  
 BUN/Creatinine ratio 15 12 - 28 Sodium 145 (H) 134 - 144 mmol/L Potassium 5.1 3.5 - 5.2 mmol/L Chloride 107 (H) 96 - 106 mmol/L  
 CO2 25 20 - 29 mmol/L Calcium 9.6 8.7 - 10.3 mg/dL Protein, total 6.7 6.0 - 8.5 g/dL Albumin 4.5 3.6 - 4.8 g/dL GLOBULIN, TOTAL 2.2 1.5 - 4.5 g/dL A-G Ratio 2.0 1.2 - 2.2 Bilirubin, total 0.4 0.0 - 1.2 mg/dL Alk. phosphatase 66 39 - 117 IU/L  
 AST (SGOT) 35 0 - 40 IU/L  
 ALT (SGPT) 25 0 - 32 IU/L Narrative Performed at:  [address]  [id number] : Hawa Muller MD, Phone:  [phone] LIPID PANEL Result Value Ref Range Cholesterol, total 142 100 - 199 mg/dL Triglyceride 44 0 - 149 mg/dL HDL Cholesterol 61 >39 mg/dL VLDL, calculated 9 5 - 40 mg/dL LDL, calculated 72 0 - 99 mg/dL Narrative Performed at:  [address]  [id number] : Hawa Muller MD, Phone:  [phone] Please call me if you have any questions: [phone] Sincerely, 
 
 
Vahe Thompson MD

## 2018-10-16 NOTE — PROGRESS NOTES
Chief Complaint   Patient presents with    Labs     fasting    Physical     Body mass index is 22.05 kg/(m^2). 1. Have you been to the ER, urgent care clinic since your last visit? Hospitalized since your last visit? No     2. Have you seen or consulted any other health care providers outside of the [address] [place] [address] since your last visit? Include any pap smears or colon screening.  No    Reviewed record in preparation for visit and have necessary documentation  Pt did not bring medication to office visit for review  Information was given to pt on Advanced Directives, Living Will  Information was given on Shingles Vaccine  Opportunity was given for questions  Goals that were addressed and/or need to be completed after this appointment include:     Health Maintenance Due   Topic Date Due    Shingrix Vaccine Age 50> (1 of 2) 05/18/2003    GLAUCOMA SCREENING Q2Y  05/18/2018    Influenza Age 9 to Adult  08/01/2018

## 2018-10-17 LAB
ALBUMIN SERPL-MCNC: 4.5 G/DL (ref 3.6–4.8)
ALBUMIN/GLOB SERPL: 2 {RATIO} (ref 1.2–2.2)
ALP SERPL-CCNC: 66 IU/L (ref 39–117)
ALT SERPL-CCNC: 25 IU/L (ref 0–32)
AST SERPL-CCNC: 35 IU/L (ref 0–40)
BILIRUB SERPL-MCNC: 0.4 MG/DL (ref 0–1.2)
BUN SERPL-MCNC: 10 MG/DL (ref 8–27)
BUN/CREAT SERPL: 15 (ref 12–28)
CALCIUM SERPL-MCNC: 9.6 MG/DL (ref 8.7–10.3)
CHLORIDE SERPL-SCNC: 107 MMOL/L (ref 96–106)
CHOLEST SERPL-MCNC: 142 MG/DL (ref 100–199)
CO2 SERPL-SCNC: 25 MMOL/L (ref 20–29)
CREAT SERPL-MCNC: 0.67 MG/DL (ref 0.57–1)
GLOBULIN SER CALC-MCNC: 2.2 G/DL (ref 1.5–4.5)
GLUCOSE SERPL-MCNC: 92 MG/DL (ref 65–99)
HDLC SERPL-MCNC: 61 MG/DL
LDLC SERPL CALC-MCNC: 72 MG/DL (ref 0–99)
POTASSIUM SERPL-SCNC: 5.1 MMOL/L (ref 3.5–5.2)
PROT SERPL-MCNC: 6.7 G/DL (ref 6–8.5)
SODIUM SERPL-SCNC: 145 MMOL/L (ref 134–144)
TRIGL SERPL-MCNC: 44 MG/DL (ref 0–149)
VLDLC SERPL CALC-MCNC: 9 MG/DL (ref 5–40)

## 2018-12-04 ENCOUNTER — HOSPITAL ENCOUNTER (EMERGENCY)
Age: 63
Discharge: HOME | End: 2018-12-04
Attending: EMERGENCY MEDICINE
Payer: COMMERCIAL

## 2018-12-04 ENCOUNTER — APPOINTMENT (OUTPATIENT)
Dept: CT IMAGING | Age: 63
End: 2018-12-04
Attending: NURSE PRACTITIONER
Payer: COMMERCIAL

## 2018-12-04 VITALS
SYSTOLIC BLOOD PRESSURE: 133 MMHG | HEIGHT: 69 IN | TEMPERATURE: 97.7 F | WEIGHT: 142 LBS | BODY MASS INDEX: 21.03 KG/M2 | OXYGEN SATURATION: 98 % | DIASTOLIC BLOOD PRESSURE: 70 MMHG | HEART RATE: 65 BPM | RESPIRATION RATE: 16 BRPM

## 2018-12-04 DIAGNOSIS — R51.9 NONINTRACTABLE HEADACHE, UNSPECIFIED CHRONICITY PATTERN, UNSPECIFIED HEADACHE TYPE: Primary | ICD-10-CM

## 2018-12-04 LAB
ALBUMIN SERPL-MCNC: 3.9 G/DL (ref 3.5–5)
ALBUMIN/GLOB SERPL: 1.2 {RATIO} (ref 1.1–2.2)
ALP SERPL-CCNC: 68 U/L (ref 45–117)
ALT SERPL-CCNC: 31 U/L (ref 12–78)
ANION GAP SERPL CALC-SCNC: 8 MMOL/L (ref 5–15)
APPEARANCE UR: CLEAR
AST SERPL-CCNC: 27 U/L (ref 15–37)
BACTERIA URNS QL MICRO: NEGATIVE /HPF
BASOPHILS # BLD: 0 K/UL (ref 0–0.1)
BASOPHILS NFR BLD: 1 % (ref 0–1)
BILIRUB SERPL-MCNC: 0.7 MG/DL (ref 0.2–1)
BILIRUB UR QL: NEGATIVE
BUN SERPL-MCNC: 15 MG/DL (ref 6–20)
BUN/CREAT SERPL: 23 (ref 12–20)
CALCIUM SERPL-MCNC: 9.3 MG/DL (ref 8.5–10.1)
CHLORIDE SERPL-SCNC: 106 MMOL/L (ref 97–108)
CO2 SERPL-SCNC: 28 MMOL/L (ref 21–32)
COLOR UR: NORMAL
COMMENT, HOLDF: NORMAL
CREAT SERPL-MCNC: 0.65 MG/DL (ref 0.55–1.02)
DIFFERENTIAL METHOD BLD: ABNORMAL
EOSINOPHIL # BLD: 0 K/UL (ref 0–0.4)
EOSINOPHIL NFR BLD: 1 % (ref 0–7)
EPITH CASTS URNS QL MICRO: NORMAL /LPF
ERYTHROCYTE [DISTWIDTH] IN BLOOD BY AUTOMATED COUNT: 12.6 % (ref 11.5–14.5)
ERYTHROCYTE [SEDIMENTATION RATE] IN BLOOD: 8 MM/HR (ref 0–30)
GLOBULIN SER CALC-MCNC: 3.2 G/DL (ref 2–4)
GLUCOSE SERPL-MCNC: 96 MG/DL (ref 65–100)
GLUCOSE UR STRIP.AUTO-MCNC: NEGATIVE MG/DL
HCT VFR BLD AUTO: 39.9 % (ref 35–47)
HGB BLD-MCNC: 13 G/DL (ref 11.5–16)
HGB UR QL STRIP: NEGATIVE
HYALINE CASTS URNS QL MICRO: NORMAL /LPF (ref 0–5)
IMM GRANULOCYTES # BLD: 0 K/UL (ref 0–0.04)
IMM GRANULOCYTES NFR BLD AUTO: 0 % (ref 0–0.5)
KETONES UR QL STRIP.AUTO: NEGATIVE MG/DL
LEUKOCYTE ESTERASE UR QL STRIP.AUTO: NEGATIVE
LYMPHOCYTES # BLD: 1 K/UL (ref 0.8–3.5)
LYMPHOCYTES NFR BLD: 33 % (ref 12–49)
MCH RBC QN AUTO: 28.7 PG (ref 26–34)
MCHC RBC AUTO-ENTMCNC: 32.6 G/DL (ref 30–36.5)
MCV RBC AUTO: 88.1 FL (ref 80–99)
MONOCYTES # BLD: 0.3 K/UL (ref 0–1)
MONOCYTES NFR BLD: 11 % (ref 5–13)
NEUTS SEG # BLD: 1.8 K/UL (ref 1.8–8)
NEUTS SEG NFR BLD: 55 % (ref 32–75)
NITRITE UR QL STRIP.AUTO: NEGATIVE
NRBC # BLD: 0 K/UL (ref 0–0.01)
NRBC BLD-RTO: 0 PER 100 WBC
PH UR STRIP: 6.5 [PH] (ref 5–8)
PLATELET # BLD AUTO: 245 K/UL (ref 150–400)
PMV BLD AUTO: 8.7 FL (ref 8.9–12.9)
POTASSIUM SERPL-SCNC: 3.6 MMOL/L (ref 3.5–5.1)
PROT SERPL-MCNC: 7.1 G/DL (ref 6.4–8.2)
PROT UR STRIP-MCNC: NEGATIVE MG/DL
RBC # BLD AUTO: 4.53 M/UL (ref 3.8–5.2)
RBC #/AREA URNS HPF: NORMAL /HPF (ref 0–5)
SAMPLES BEING HELD,HOLD: NORMAL
SODIUM SERPL-SCNC: 142 MMOL/L (ref 136–145)
SP GR UR REFRACTOMETRY: 1.01 (ref 1–1.03)
UR CULT HOLD, URHOLD: NORMAL
UROBILINOGEN UR QL STRIP.AUTO: 1 EU/DL (ref 0.2–1)
WBC # BLD AUTO: 3.2 K/UL (ref 3.6–11)
WBC URNS QL MICRO: NORMAL /HPF (ref 0–4)

## 2018-12-04 PROCEDURE — 70450 CT HEAD/BRAIN W/O DYE: CPT

## 2018-12-04 PROCEDURE — 85652 RBC SED RATE AUTOMATED: CPT

## 2018-12-04 PROCEDURE — 81001 URINALYSIS AUTO W/SCOPE: CPT

## 2018-12-04 PROCEDURE — 36415 COLL VENOUS BLD VENIPUNCTURE: CPT

## 2018-12-04 PROCEDURE — 99284 EMERGENCY DEPT VISIT MOD MDM: CPT

## 2018-12-04 PROCEDURE — 85025 COMPLETE CBC W/AUTO DIFF WBC: CPT

## 2018-12-04 PROCEDURE — 74011250636 HC RX REV CODE- 250/636: Performed by: NURSE PRACTITIONER

## 2018-12-04 PROCEDURE — 80053 COMPREHEN METABOLIC PANEL: CPT

## 2018-12-04 PROCEDURE — 96360 HYDRATION IV INFUSION INIT: CPT

## 2018-12-04 PROCEDURE — 74011636320 HC RX REV CODE- 636/320: Performed by: RADIOLOGY

## 2018-12-04 PROCEDURE — 70496 CT ANGIOGRAPHY HEAD: CPT

## 2018-12-04 RX ORDER — BISMUTH SUBSALICYLATE 262 MG
1 TABLET,CHEWABLE ORAL DAILY
COMMUNITY

## 2018-12-04 RX ORDER — BUTALBITAL, ACETAMINOPHEN AND CAFFEINE 300; 40; 50 MG/1; MG/1; MG/1
1 CAPSULE ORAL
Qty: 20 CAP | Refills: 0 | Status: SHIPPED | OUTPATIENT
Start: 2018-12-04 | End: 2019-01-11

## 2018-12-04 RX ADMIN — SODIUM CHLORIDE 1000 ML: 900 INJECTION, SOLUTION INTRAVENOUS at 10:03

## 2018-12-04 RX ADMIN — IOPAMIDOL 100 ML: 755 INJECTION, SOLUTION INTRAVENOUS at 10:59

## 2018-12-04 NOTE — ED TRIAGE NOTES
Pt states awoke at 0130 today with \"worst headache of my life\" with pain 9.5/10. Pt took Advil with no relief. Pt also with pain radiating up right leg from calf. States pain is in the left temporal area and accompanied by some nausea. Pt with similar episode last week that resolved after taking Advil and sleeping for approximately 10 hours. Pt states prior to both episodes had a a \"squiggle\" in her vision that resolved this last time with peppermint oil applied to the temple. Pt denies any slurred speech, facial droop, or weakness.

## 2018-12-04 NOTE — LETTER
[address] 
[facility] EMERGENCY DEPT 
[address] [id number] [phone] Work/School Note Date: 12/4/2018 To Whom It May concern: 
 
Evy Florentino was seen and treated today in the emergency room by the following provider(s): 
Attending Provider: Jennifer Farr MD 
Nurse Practitioner: Ammon Jeffries NP. Evy Florentino may return to work on 12/6/2018.  
 
Sincerely, 
 
 
 
 
John Mix NP

## 2018-12-04 NOTE — ED PROVIDER NOTES
70 y.o. female with past medical history significant for anemia and IBS who presents ambulatory from home accompanied by friend with chief complaint of headaches. Patient reports onset of a left posterior headache behind her left ear last night and states that she was unable to sleep well. Patient described the headache as \"pressure\" and as the \"worst headache she has had her entire life. \" She notes history of a similar headache last week which resolved after taking Advil and sleeping. Patient also notes right leg \"aching\" pain yesterday. Pertinent negatives include blurry vision, diplopia, weakness, unsteady gait, nausea, vomiting, fever, chest pain, and shortness of breath. There are no other acute medical concerns at this time. Social hx: Denies tobacco use; denies alcohol use; denies illicit drug use PCP: Kaya Chaidez MD 
Past Medical History: 
No date: Anemia NEC No date: IBS (irritable bowel syndrome) Past Surgical History: 
No date: DILATION AND CURETTAGE 
1979: HX GYN Comment:  D&C Note written by Goyo Olmstead, as dictated by Jensen Weir PA-C 9:37 AM 
 
 
 
 
  
 
Past Medical History:  
Diagnosis Date  Anemia NEC   
 IBS (irritable bowel syndrome) Past Surgical History:  
Procedure Laterality Date [address] D&C Family History:  
Problem Relation Age of Onset  Heart Disease Mother  Hypertension Mother  Dementia Mother  Stroke Father  Diabetes Father  Diabetes Brother  Cancer Brother   
     leukermia  Diabetes Paternal Aunt  Diabetes Paternal Uncle  Cancer Maternal Grandmother   
     leukemia  Diabetes Paternal Grandfather Social History Socioeconomic History  Marital status:  Spouse name: Not on file  Number of children: Not on file  Years of education: Not on file  Highest education level: Not on file Social Needs  Financial resource strain: Not on file  Food insecurity - worry: Not on file  Food insecurity - inability: Not on file  Transportation needs - medical: Not on file  Transportation needs - non-medical: Not on file Occupational History  Not on file Tobacco Use  Smoking status: Never Smoker  Smokeless tobacco: Never Used Substance and Sexual Activity  Alcohol use: No  
 Drug use: No  
 Sexual activity: Not on file Other Topics Concern  Not on file Social History Narrative  Not on file ALLERGIES: Patient has no known allergies. Review of Systems Constitutional: Negative for appetite change, chills, diaphoresis, fatigue and fever. HENT: Negative for congestion, ear discharge, ear pain, sinus pressure, sinus pain, sore throat and trouble swallowing. Eyes: Negative for photophobia, pain, redness and visual disturbance. Respiratory: Negative for chest tightness, shortness of breath and wheezing. Cardiovascular: Negative for chest pain and palpitations. Gastrointestinal: Negative for abdominal distention, abdominal pain, nausea and vomiting. Endocrine: Negative. Genitourinary: Negative for difficulty urinating, flank pain, frequency and urgency. Musculoskeletal: Positive for myalgias (right leg). Negative for back pain, neck pain and neck stiffness. Skin: Negative for color change, pallor, rash and wound. Allergic/Immunologic: Negative. Neurological: Positive for headaches. Negative for dizziness, speech difficulty and weakness. Hematological: Does not bruise/bleed easily. Psychiatric/Behavioral: Negative for behavioral problems. The patient is not nervous/anxious. All other systems reviewed and are negative. Vitals:  
 12/04/18 8177 12/04/18 1244 BP: (!) 201/79 172/75 Pulse: 65 Resp: 16 Temp: 97.7 °F (36.5 °C) SpO2: 100% Weight: 64.4 kg (142 lb) Height: 5' 8.5\" (1.74 m) Physical Exam  
 Constitutional: She is oriented to person, place, and time. She appears well-developed and well-nourished. No distress. HENT:  
Head: Normocephalic and atraumatic. Right Ear: External ear normal.  
Left Ear: External ear normal.  
Nose: Nose normal.  
Mouth/Throat: Oropharynx is clear and moist.  
Eyes: Conjunctivae and EOM are normal. Pupils are equal, round, and reactive to light. Right eye exhibits no discharge. Left eye exhibits no discharge. Neck: Normal range of motion. Neck supple. No JVD present. No tracheal deviation present. Cardiovascular: Normal rate, regular rhythm, normal heart sounds and intact distal pulses. Exam reveals no gallop. No murmur heard. Pulmonary/Chest: Effort normal and breath sounds normal. No respiratory distress. She has no wheezes. She has no rales. She exhibits no tenderness. Abdominal: Soft. Bowel sounds are normal. She exhibits no distension. There is no tenderness. There is no rebound and no guarding. Genitourinary:  
Genitourinary Comments: Negative Musculoskeletal: Normal range of motion. She exhibits no edema or tenderness. Neurological: She is alert and oriented to person, place, and time. Skin: Skin is warm and dry. No rash noted. No erythema. No pallor. Psychiatric: She has a normal mood and affect. Her behavior is normal. Judgment and thought content normal.  
Nursing note and vitals reviewed. MDM Number of Diagnoses or Management Options Nonintractable headache, unspecified chronicity pattern, unspecified headache type: new and requires workup Diagnosis management comments: Plan: 
Discharge to home and follow up with PCP. Follow up with outpatient neurology. Return to ED with worsening symptoms. Amount and/or Complexity of Data Reviewed Clinical lab tests: ordered and reviewed Tests in the radiology section of CPT®: ordered and reviewed Discuss the patient with other providers: yes (Discussed plan of care with Dr. Viv Agarwal and Dr. Bee Can.) [phone] [id number]: Reviewed lab and radiology data. Discussed plan of care with Dr. Viv Agarwal who is in agreement. [id number]: Spoke with Dr. Bee Can regarding results and plan of care. Patient to be discharged and follow up with outpatient neurology. 1:00 PM 
Pt has been reexamined. Pt has no new complaints, changes or physical findings. Patient states headache resolved after IVF. Care plan outlined and precautions discussed. All available results were reviewed with pt. All medications were reviewed with pt. All of pt's questions and concerns were addressed. Pt agrees to F/U as instructed and agrees to return to ED upon further deterioration. Pt is ready to go home. Monika Hopkins NP Procedures

## 2018-12-04 NOTE — DISCHARGE INSTRUCTIONS

## 2018-12-06 ENCOUNTER — PATIENT OUTREACH (OUTPATIENT)
Dept: FAMILY MEDICINE CLINIC | Age: 63
End: 2018-12-06

## 2018-12-11 ENCOUNTER — PATIENT OUTREACH (OUTPATIENT)
Dept: FAMILY MEDICINE CLINIC | Age: 63
End: 2018-12-11

## 2018-12-11 NOTE — LETTER
12/11/2018 10:07 AM 
 
Ms. Natanael Gallardo [place] [address] [address] [address] I have been unable to reach you by telephone. I am the  Nurse Navigator working with your physician's office. Part of my job is to follow up with patients who have been in the emergency department  to see how they are feeling, answer any questions they may have about their visit and also make sure they have a follow-up appointment to see their primary care doctor or specialist.   
 
Please call me so we may discuss your emergency room episode and review your medical status. I can be reached Monday thru Friday at the telephone number listed above. Thank you, Ady Johnson RN

## 2018-12-11 NOTE — PROGRESS NOTES
Unable to reach patient by phone after two attempts to complete ED follow up. Will mail letter today.

## 2018-12-12 ENCOUNTER — PATIENT OUTREACH (OUTPATIENT)
Dept: FAMILY MEDICINE CLINIC | Age: 63
End: 2018-12-12

## 2018-12-12 NOTE — PROGRESS NOTES
ED Discharge Follow-Up    Date/Time:     2018 12:10 PM    Patient presented to [facility]  ED on 18 and was diagnosed with headache. Top Challenges reviewed with the provider   none           Method of communication with provider :none    Nurse Navigator(NN) contacted the  patient  by telephone to perform post ED discharge assessment. Verified name and  with patient as identifiers. Provided introduction to self, and explanation of the Nurse Navigator role. Patient reported assessment: \"I have not had any headache what soever since going to the ER\". Medication(s):   New Medications at Discharge: Fioricet  Changed Medications at Discharge: none  Discontinued Medications at Discharge: none    There were no barriers to obtaining medications identified at this time. Reviewed discharge instructions and red flags with  patient who voiced understanding. Patient given an opportunity to ask questions and does not have any further questions or concerns at this time. The patient agrees to contact the PCP office for questions related to their healthcare. Patient reminded that there are physicians on call 24 hours a day / 7 days a week (M- 5pm to 8am and from Friday 5pm until Monday 8am for the weekend) should the patient have questions or concerns. NN provided contact information for future reference. Offered follow up appointment with PCP: yes   [facility] follow up appointment(s):   Future Appointments   Date Time Provider Juanita Hall   2018  2:25 PM [facility]      Non-[facility] follow up appointment(s): n/a  [employer] [facility]:  n/a    www. [email or web address] 9 AM- 9 PM.   Phone [phone]      Goals     None

## 2018-12-21 ENCOUNTER — OFFICE VISIT (OUTPATIENT)
Dept: FAMILY MEDICINE CLINIC | Age: 63
End: 2018-12-21

## 2018-12-21 VITALS
TEMPERATURE: 98.5 F | HEART RATE: 74 BPM | SYSTOLIC BLOOD PRESSURE: 118 MMHG | BODY MASS INDEX: 21.82 KG/M2 | OXYGEN SATURATION: 97 % | RESPIRATION RATE: 18 BRPM | WEIGHT: 144 LBS | DIASTOLIC BLOOD PRESSURE: 67 MMHG | HEIGHT: 68 IN

## 2018-12-21 DIAGNOSIS — G43.109 MIGRAINE WITH AURA AND WITHOUT STATUS MIGRAINOSUS, NOT INTRACTABLE: Primary | ICD-10-CM

## 2018-12-21 NOTE — PATIENT INSTRUCTIONS
Migraine Headache: Care Instructions  Your Care Instructions  Migraines are painful, throbbing headaches that often start on one side of the head. They may cause nausea and vomiting and make you sensitive to light, sound, or smell. Without treatment, migraines can last from 4 hours to a few days. Medicines can help prevent migraines or stop them after they have started. Your doctor can help you find which ones work best for you. Follow-up care is a key part of your treatment and safety. Be sure to make and go to all appointments, and call your doctor if you are having problems. It's also a good idea to know your test results and keep a list of the medicines you take. How can you care for yourself at home? · Do not drive if you have taken a prescription pain medicine. · Rest in a quiet, dark room until your headache is gone. Close your eyes, and try to relax or go to sleep. Don't watch TV or read. · Put a cold, moist cloth or cold pack on the painful area for 10 to 20 minutes at a time. Put a thin cloth between the cold pack and your skin. · Use a warm, moist towel or a heating pad set on low to relax tight shoulder and neck muscles. · Have someone gently massage your neck and shoulders. · Take your medicines exactly as prescribed. Call your doctor if you think you are having a problem with your medicine. You will get more details on the specific medicines your doctor prescribes. · Be careful not to take pain medicine more often than the instructions allow. You could get worse or more frequent headaches when the medicine wears off. To prevent migraines  · Keep a headache diary so you can figure out what triggers your headaches. Avoiding triggers may help you prevent headaches. Record when each headache began, how long it lasted, and what the pain was like.  (Was it throbbing, aching, stabbing, or dull?) Write down any other symptoms you had with the headache, such as nausea, flashing lights or dark spots, or sensitivity to bright light or loud noise. Note if the headache occurred near your period. List anything that might have triggered the headache. Triggers may include certain foods (chocolate, cheese, wine) or odors, smoke, bright light, stress, or lack of sleep. · If your doctor has prescribed medicine for your migraines, take it as directed. You may have medicine that you take only when you get a migraine and medicine that you take all the time to help prevent migraines. ? If your doctor has prescribed medicine for when you get a headache, take it at the first sign of a migraine, unless your doctor has given you other instructions. ? If your doctor has prescribed medicine to prevent migraines, take it exactly as prescribed. Call your doctor if you think you are having a problem with your medicine. · Find healthy ways to deal with stress. Migraines are most common during or right after stressful times. Take time to relax before and after you do something that has caused a migraine in the past.  · Try to keep your muscles relaxed by keeping good posture. Check your jaw, face, neck, and shoulder muscles for tension. Try to relax them. When you sit at a desk, change positions often. And make sure to stretch for 30 seconds each hour. · Get plenty of sleep and exercise. · Eat meals on a regular schedule. Avoid foods and drinks that often trigger migraines. These include chocolate, alcohol (especially red wine and port), aspartame, monosodium glutamate (MSG), and some additives found in foods (such as hot dogs, chilel, cold cuts, aged cheeses, and pickled foods). · Limit caffeine. Don't drink too much coffee, tea, or soda. But don't quit caffeine suddenly. That can also give you migraines. · Do not smoke or allow others to smoke around you. If you need help quitting, talk to your doctor about stop-smoking programs and medicines. These can increase your chances of quitting for good.   · If you are taking birth control pills or hormone therapy, talk to your doctor about whether they are triggering your migraines. When should you call for help? Call 911 anytime you think you may need emergency care. For example, call if:    · You have signs of a stroke. These may include:  ? Sudden numbness, paralysis, or weakness in your face, arm, or leg, especially on only one side of your body. ? Sudden vision changes. ? Sudden trouble speaking. ? Sudden confusion or trouble understanding simple statements. ? Sudden problems with walking or balance. ? A sudden, severe headache that is different from past headaches.    Call your doctor now or seek immediate medical care if:    · You have new or worse nausea and vomiting.     · You have a new or higher fever.     · Your headache gets much worse.    Watch closely for changes in your health, and be sure to contact your doctor if:    · You are not getting better after 2 days (48 hours). Where can you learn more? Go to http://ambrosio-hero.info/. Enter A421 in the search box to learn more about \"Migraine Headache: Care Instructions. \"  Current as of: June 4, 2018  Content Version: 11.8  © 6441-7271 [employer]. Care instructions adapted under license by [employer] (which disclaims liability or warranty for this information). If you have questions about a medical condition or this instruction, always ask your healthcare professional. [address] any warranty or liability for your use of this information. Migraine Headache: Care Instructions  Your Care Instructions  Migraines are painful, throbbing headaches that often start on one side of the head. They may cause nausea and vomiting and make you sensitive to light, sound, or smell. Without treatment, migraines can last from 4 hours to a few days. Medicines can help prevent migraines or stop them after they have started.  Your doctor can help you find which ones work best for you. Follow-up care is a key part of your treatment and safety. Be sure to make and go to all appointments, and call your doctor if you are having problems. It's also a good idea to know your test results and keep a list of the medicines you take. How can you care for yourself at home? · Do not drive if you have taken a prescription pain medicine. · Rest in a quiet, dark room until your headache is gone. Close your eyes, and try to relax or go to sleep. Don't watch TV or read. · Put a cold, moist cloth or cold pack on the painful area for 10 to 20 minutes at a time. Put a thin cloth between the cold pack and your skin. · Use a warm, moist towel or a heating pad set on low to relax tight shoulder and neck muscles. · Have someone gently massage your neck and shoulders. · Take your medicines exactly as prescribed. Call your doctor if you think you are having a problem with your medicine. You will get more details on the specific medicines your doctor prescribes. · Be careful not to take pain medicine more often than the instructions allow. You could get worse or more frequent headaches when the medicine wears off. To prevent migraines  · Keep a headache diary so you can figure out what triggers your headaches. Avoiding triggers may help you prevent headaches. Record when each headache began, how long it lasted, and what the pain was like. (Was it throbbing, aching, stabbing, or dull?) Write down any other symptoms you had with the headache, such as nausea, flashing lights or dark spots, or sensitivity to bright light or loud noise. Note if the headache occurred near your period. List anything that might have triggered the headache. Triggers may include certain foods (chocolate, cheese, wine) or odors, smoke, bright light, stress, or lack of sleep. · If your doctor has prescribed medicine for your migraines, take it as directed.  You may have medicine that you take only when you get a migraine and medicine that you take all the time to help prevent migraines. ? If your doctor has prescribed medicine for when you get a headache, take it at the first sign of a migraine, unless your doctor has given you other instructions. ? If your doctor has prescribed medicine to prevent migraines, take it exactly as prescribed. Call your doctor if you think you are having a problem with your medicine. · Find healthy ways to deal with stress. Migraines are most common during or right after stressful times. Take time to relax before and after you do something that has caused a migraine in the past.  · Try to keep your muscles relaxed by keeping good posture. Check your jaw, face, neck, and shoulder muscles for tension. Try to relax them. When you sit at a desk, change positions often. And make sure to stretch for 30 seconds each hour. · Get plenty of sleep and exercise. · Eat meals on a regular schedule. Avoid foods and drinks that often trigger migraines. These include chocolate, alcohol (especially red wine and port), aspartame, monosodium glutamate (MSG), and some additives found in foods (such as hot dogs, chilel, cold cuts, aged cheeses, and pickled foods). · Limit caffeine. Don't drink too much coffee, tea, or soda. But don't quit caffeine suddenly. That can also give you migraines. · Do not smoke or allow others to smoke around you. If you need help quitting, talk to your doctor about stop-smoking programs and medicines. These can increase your chances of quitting for good. · If you are taking birth control pills or hormone therapy, talk to your doctor about whether they are triggering your migraines. When should you call for help? Call 911 anytime you think you may need emergency care. For example, call if:    · You have signs of a stroke. These may include:  ? Sudden numbness, paralysis, or weakness in your face, arm, or leg, especially on only one side of your body.   ? Sudden vision changes. ? Sudden trouble speaking. ? Sudden confusion or trouble understanding simple statements. ? Sudden problems with walking or balance. ? A sudden, severe headache that is different from past headaches.    Call your doctor now or seek immediate medical care if:    · You have new or worse nausea and vomiting.     · You have a new or higher fever.     · Your headache gets much worse.    Watch closely for changes in your health, and be sure to contact your doctor if:    · You are not getting better after 2 days (48 hours). Where can you learn more? Go to http://ambrosio-hero.info/. Enter Y755 in the search box to learn more about \"Migraine Headache: Care Instructions. \"  Current as of: June 4, 2018  Content Version: 11.8  © 3340-6861 [employer]. Care instructions adapted under license by [employer] (which disclaims liability or warranty for this information). If you have questions about a medical condition or this instruction, always ask your healthcare professional. [place] [address] any warranty or liability for your use of this information.

## 2018-12-21 NOTE — PROGRESS NOTES
[address] [facility], [address] [facility]  [phone]           Progress Note    Patient: Karyle Polite MRN: [id number]  SSN: [id number]    YOB: 1955  Age: 70 y.o. Sex: female        Chief Complaint   Patient presents with    Headache     headache F/U from ER         Subjective:     Encounter Diagnoses   Name Primary?  Migraine with aura and without status migrainosus, not intractable: She has had visual lines and squiggles for some months intermittently. She had to go to the emergency room recently for a headache that woke her up in the middle the night. It was a worse headache she never had. It is been preceded during the day with some of the visual symptoms as above. She had an extensive workup including a CT scan and a CTA. Both of those tests were normal.  She was diagnosed with migraines-new onset but she has had migraine equivalents representing the visual symptoms. She is not under any particular amount of unusual stress. Is not clear why these headaches occurred when they did and awoke her from sleep. Yes       Current and past medical information:    Current Medications after this visit[de-identified]     Current Outpatient Medications   Medication Sig    Omega-3 Fatty Acids (FISH OIL) 500 mg cap Take  by mouth.  multivitamin (ONE A DAY) tablet Take 1 Tab by mouth daily.  digestive enzymes tab Take  by mouth.  butalbital-acetaminophen-caff (FIORICET) -40 mg per capsule Take 1 Cap by mouth every six (6) hours as needed for Pain.  L. acidophilus/Strept/La p-talita (NATE-Q PO) Take  by mouth. No current facility-administered medications for this visit.         Patient Active Problem List    Diagnosis Date Noted    Migraine with aura and without status migrainosus, not intractable 12/21/2018    Anemia 07/08/2018    IBS (irritable bowel syndrome) 03/22/2012    Chest discomfort 05/03/2011       Past Medical History:   Diagnosis Date    Anemia NEC     IBS (irritable bowel syndrome)        No Known Allergies    Past Surgical History:   Procedure Laterality Date    DILATION AND CURETTAGE      HX GYN  1979    D&C       Social History     Socioeconomic History    Marital status:      Spouse name: Not on file    Number of children: Not on file    Years of education: Not on file    Highest education level: Not on file   Tobacco Use    Smoking status: Never Smoker    Smokeless tobacco: Never Used   Substance and Sexual Activity    Alcohol use: No    Drug use: No       Review of Systems   Constitutional: Negative. Negative for chills, fever, malaise/fatigue and weight loss. HENT: Negative. Negative for hearing loss. Eyes: Negative. Negative for blurred vision and double vision. Visual disturbances described with squiggles and changing lines. This did affect her vision. Respiratory: Negative. Negative for cough, hemoptysis, sputum production and shortness of breath. Cardiovascular: Negative. Negative for chest pain, palpitations and orthopnea. Gastrointestinal: Negative. Negative for abdominal pain, blood in stool, heartburn, nausea and vomiting. Genitourinary: Negative. Negative for dysuria, frequency and urgency. Musculoskeletal: Negative. Negative for back pain, myalgias and neck pain. Prominent right fifth head of metatarsal.  Not painful. Skin: Negative. Negative for rash. Neurological: Positive for headaches. Negative for dizziness, tingling, tremors and weakness. Severe headache recently. See HPI. Endo/Heme/Allergies: Negative. Psychiatric/Behavioral: Negative. Negative for depression. Objective:     Vitals:    12/21/18 1422   BP: 118/67   Pulse: 74   Resp: 18   Temp: 98.5 °F (36.9 °C)   TempSrc: Oral   SpO2: 97%   Weight: 144 lb (65.3 kg)   Height: 5' 8\" (1.727 m)      Body mass index is 21.9 kg/m².     Physical Exam   Constitutional: She is oriented to person, place, and time and well-developed, well-nourished, and in no distress. No distress. HENT:   Head: Normocephalic and atraumatic. Mouth/Throat: Oropharynx is clear and moist.   Eyes: Conjunctivae are normal.   Neck: No thyromegaly present. Cardiovascular: Normal rate, regular rhythm and normal heart sounds. No murmur heard. Pulmonary/Chest: Effort normal and breath sounds normal. No respiratory distress. Abdominal: Soft. She exhibits no distension. Musculoskeletal: She exhibits deformity. Prominent right lateral metatarsal head   Neurological: She is alert and oriented to person, place, and time. Skin: Skin is warm. No rash noted. She is not diaphoretic. No erythema. Psychiatric: Mood and affect normal.   Nursing note and vitals reviewed. Health Maintenance Due   Topic Date Due    Shingrix Vaccine Age 49> (1 of 2) 05/18/2003    GLAUCOMA SCREENING Q2Y  05/18/2018         Assessment and orders:     Encounter Diagnoses     ICD-10-CM ICD-9-CM   1. Migraine with aura and without status migrainosus, not intractable G43.109 346.00     Diagnoses and all orders for this visit:    1. Migraine with aura and without status migrainosus, not intractable        Plan of care:  Discussed diagnoses in detail with patient. Medication risks/benefits/side effects discussed with patient. All of the patient's questions were addressed. The patient understands and agrees with our plan of care. The patient knows to call back if they are unsure of or forget any changes we discussed today or if the symptoms change. The patient received an After-Visit Summary which contains VS, orders, medication list and allergy list. This can be used as a \"mini-medical record\" should they have to seek medical care while out of town.     Patient Care Team:  Cornelio Bob MD as PCP - Rikki Gottlieb MD (Dermatology)  Sanford Clemons MD (Rheumatology)  Mark Roberts RN as Ambulatory Care Navigator    Follow-up Disposition:  Return in about 3 months (around 3/21/2019). Future Appointments   Date Time Provider Juanita Isabel   1/11/2019  1:00 PM Gloria [place] [address]       Signed By: Beryle Snider, MD     December 21, 2018        This note was created using voice recognition software. Despite editing, there may be syntax errors.

## 2018-12-21 NOTE — PROGRESS NOTES
1. Have you been to the ER, urgent care clinic since your last visit? Hospitalized since your last visit? Er for headache    2. Have you seen or consulted any other health care providers outside of the [facility] since your last visit? Including any pap smears or colon screening. no    Reviewed record in preparation for visit and have necessary documentation    Pt did not bring medication to office visit for review    Information was given to pt on Advanced Directives, Living Will  Opportunity was given for questions    Goals that were addressed and/or need to be completed during or after this appointment include   Health Maintenance Due   Topic Date Due    Shingrix Vaccine Age 50> (1 of 2) 05/18/2003    GLAUCOMA SCREENING Q2Y  05/18/2018     Body mass index is 21.9 kg/m².

## 2019-01-04 ENCOUNTER — PATIENT OUTREACH (OUTPATIENT)
Dept: FAMILY MEDICINE CLINIC | Age: 64
End: 2019-01-04

## 2019-01-11 ENCOUNTER — OFFICE VISIT (OUTPATIENT)
Dept: NEUROLOGY | Age: 64
End: 2019-01-11

## 2019-01-11 VITALS
OXYGEN SATURATION: 98 % | RESPIRATION RATE: 19 BRPM | DIASTOLIC BLOOD PRESSURE: 64 MMHG | BODY MASS INDEX: 21.79 KG/M2 | TEMPERATURE: 97.8 F | WEIGHT: 147.1 LBS | SYSTOLIC BLOOD PRESSURE: 130 MMHG | HEIGHT: 69 IN | HEART RATE: 67 BPM

## 2019-01-11 DIAGNOSIS — R51.9 NEW ONSET OF HEADACHES AFTER AGE 50: Primary | ICD-10-CM

## 2019-01-11 DIAGNOSIS — H53.9 VISUAL DISTURBANCE: ICD-10-CM

## 2019-01-11 RX ORDER — FERROUS SULFATE 220 (44)/5
SOLUTION, ORAL ORAL DAILY
COMMUNITY
End: 2021-12-07

## 2019-01-11 NOTE — PATIENT INSTRUCTIONS
For headache abortive therapy, use Cambia 1 packet at onset and repeat in 2 hours x 1 if needed, max 2 in 24 hours.

## 2019-01-11 NOTE — PROGRESS NOTES
Chief Complaint Patient presents with  
 Headache  
  had 2 migraine headaches in December, reports having aura before headaches  New Patient Visit Vitals /58 (BP 1 Location: Left arm, BP Patient Position: Sitting) Pulse 67 Temp 97.8 °F (36.6 °C) (Oral) Resp 19 Ht 5' 8.5\" (1.74 m) Wt 66.7 kg (147 lb 1.6 oz) SpO2 98% BMI 22.04 kg/m² Low WBC for the past 18 months 
 
Stated that her brother has the same symptoms when he gets headaches. 1. Have you been to the ER, urgent care clinic since your last visit? Hospitalized since your last visit?12/2018 [facility] ED DX:Migraine Headaches 2. Have you seen or consulted any other health care providers outside of the [address] [place] [address] since your last visit? Include any pap smears or colon screening.  No

## 2019-01-11 NOTE — PROGRESS NOTES
Rickie We-07-A [address] [facility], [facility]  
[phone] Fax Referring: [facility] ED Primary: Dafne Gonzales MD 
 
 
Chief Complaint Patient presents with  
 Headache  
  had 2 migraine headaches in December, reports having aura before headaches  New Patient 66-year-old left-handed and self describe somewhat ambidextrous woman who presents today for evaluation of what she calls migraine headache. She is here with her friend. She says that she has had new onset of headache. This started in December. She had 2 episodes and she was told this was migraine. She has never had headaches before in her life and is never considered herself a headache person. She says she was at work and had the sudden onset of headache and had to leave home because the discomfort was so bad. She went home and slept. She that she felt fine and then 1 week later she had the recurrence of the same type of headache. She saw Dr. Dary Ramirez who suggested going to the emergency department. She went was evaluated had a negative head CT. Headaches are located in the left occipital region. She has associated photophobia but no phonophobia nausea or vomiting. She describes it as an intense pressure sensation. She says that at times the right side of her body had decreased sensation in the upper and lower extremity with a headache. She had visual changes well and says that she unable to see clearly. Her brother has headaches where he gets a visual disturbance and if he takes an Advil he does not get the headache. She had no loss or alteration in consciousness. No visual field cut. No difficulty with speech language or swallowing. No chest pain or palpitations. No fever or chills. No injury. No changes in meds. No inciting factor.   Graph review of the emergency room documentation from December 4, 2018 finds that she presented with a complaint of headache reporting left posterior headache started the evening before and could not sleep. Described as the worst headache she is had in her life. Described a similar headache the week before. Right leg aching pain. She did take some Advil. Her examination was normal as documented. CT scan of the head CTA of the head personally reviewed and normal. 
 
Past Medical History:  
Diagnosis Date  Anemia NEC   
 IBS (irritable bowel syndrome) Past Surgical History:  
Procedure Laterality Date [address] D&C Current Outpatient Medications Medication Sig Dispense Refill  Lactobac no.41/Bifidobact no.7 (PROBIOTIC-10 PO) Take  by mouth.  ferrous sulfate 220 mg (44 mg iron)/5 mL solution Take  by mouth daily.  Omega-3 Fatty Acids (FISH OIL) 500 mg cap Take  by mouth.  multivitamin (ONE A DAY) tablet Take 1 Tab by mouth daily.  L. acidophilus/Strept/La p-talita (NATE-Q PO) Take  by mouth.  digestive enzymes tab Take  by mouth. No Known Allergies Social History Tobacco Use  Smoking status: Never Smoker  Smokeless tobacco: Never Used Substance Use Topics  Alcohol use: No  
 Drug use: No  
 
 
Family History Problem Relation Age of Onset  Heart Disease Mother  Hypertension Mother  Dementia Mother  Stroke Father  Diabetes Father  Diabetes Brother  Cancer Brother   
     leukermia  Diabetes Paternal Aunt  Diabetes Paternal Uncle  Cancer Maternal Grandmother   
     leukemia  Diabetes Paternal Grandfather Review of Systems Pertinent positives and negatives as noted with remainder of comprehensive review negative Examination Visit Vitals /64 Pulse 67 Temp 97.8 °F (36.6 °C) (Oral) Resp 19 Ht 5' 8.5\" (1.74 m) Wt 66.7 kg (147 lb 1.6 oz) SpO2 98% BMI 22.04 kg/m² Pleasant, well appearing. No icterus. Oropharynx clear. Supple neck without bruit. Heart regular. No murmur. No edema. Neurologically, she is awake, alert, and oriented with normal speech and language. Her cognition is normal.  She is able to discuss her medical history. She is able to discuss her medications. She is able to discuss current events. Intact cranial nerves 2-12. No nystagmus. Visual fields full to confrontation. Disk margins are flat bilaterally. She has normal bulk and tone. She has no abnormal movement. She has no pronation or drift. She generates full strength in the upper and lower extremities to direct confrontational testing. Reflexes are symmetrical in the upper and lower extremities bilaterally. Her toes are down bilaterally. No Castro. Finger nose finger and rapid alternating movements are normal.  Steady gait. No sensory deficit to primary modalities. Impression/Plan Very pleasant lady 70years old with new onset headache as well as visual disturbance and certainly this always concerning for mass lesion or vascular abnormality aneurysmal rupture etc.  The CT and CTA being negative a positive sign as is her normal examination however it does not exclude ominous pathology. The CT scan simply is a good screen. This is again a new onset headache in a 24-year-old and described as the worst type of pain she is ever felt. She did not have a lumbar puncture in the emergency department. Sedimentation rate was 8.   Because of this we will proceed with an MRI of the brain looking for hemosiderin or suggestion of hemorrhage as well as a mass lesion which would be etiology of secondary headache versus vascular lesion of other etiology i.e. AVM etc.  We discussed that migrainous type headaches can occur in older people however it is not really common to have them occur at her age [facility] but it can occur and is a diagnosis of exclusion and other ominous etiologies need to be excluded in order to be prudent and look after her best interest and explore abnormalities that could lead to significant mortality or morbidity. Given that we will proceed with an MRI of the brain. I did give her some samples of Cambia to use just to have on hand in case 1 of these headaches came up to see if that would help. She will follow after her MRI Matty Clifton MD 
 
 
 
This note was created using voice recognition software. Despite editing, there may be syntax errors. This note will not be viewable in [address]

## 2019-01-25 ENCOUNTER — HOSPITAL ENCOUNTER (OUTPATIENT)
Dept: MRI IMAGING | Age: 64
Discharge: HOME | End: 2019-01-25
Attending: PSYCHIATRY & NEUROLOGY
Payer: COMMERCIAL

## 2019-01-25 DIAGNOSIS — R51.9 NEW ONSET OF HEADACHES AFTER AGE 50: ICD-10-CM

## 2019-01-25 DIAGNOSIS — H53.9 VISUAL DISTURBANCE: ICD-10-CM

## 2019-01-25 PROCEDURE — 74011250636 HC RX REV CODE- 250/636: Performed by: PSYCHIATRY & NEUROLOGY

## 2019-01-25 PROCEDURE — A9575 INJ GADOTERATE MEGLUMI 0.1ML: HCPCS | Performed by: PSYCHIATRY & NEUROLOGY

## 2019-01-25 PROCEDURE — 70553 MRI BRAIN STEM W/O & W/DYE: CPT

## 2019-01-25 RX ORDER — GADOTERATE MEGLUMINE 376.9 MG/ML
13 INJECTION INTRAVENOUS
Status: COMPLETED | OUTPATIENT
Start: 2019-01-25 | End: 2019-01-25

## 2019-01-25 RX ADMIN — GADOTERATE MEGLUMINE 13 ML: 376.9 INJECTION INTRAVENOUS at 12:18

## 2019-09-26 ENCOUNTER — HOSPITAL ENCOUNTER (OUTPATIENT)
Dept: MAMMOGRAPHY | Age: 64
Discharge: HOME | End: 2019-09-26
Attending: FAMILY MEDICINE
Payer: MEDICARE

## 2019-09-26 DIAGNOSIS — Z12.39 BREAST SCREENING, UNSPECIFIED: ICD-10-CM

## 2019-09-26 PROCEDURE — 77067 SCR MAMMO BI INCL CAD: CPT

## 2019-10-21 ENCOUNTER — OFFICE VISIT (OUTPATIENT)
Dept: FAMILY MEDICINE CLINIC | Age: 64
End: 2019-10-21

## 2019-10-21 VITALS
BODY MASS INDEX: 22.07 KG/M2 | WEIGHT: 149 LBS | RESPIRATION RATE: 16 BRPM | SYSTOLIC BLOOD PRESSURE: 125 MMHG | DIASTOLIC BLOOD PRESSURE: 65 MMHG | OXYGEN SATURATION: 100 % | HEART RATE: 61 BPM | HEIGHT: 69 IN | TEMPERATURE: 97.5 F

## 2019-10-21 DIAGNOSIS — R68.89 COLD INTOLERANCE: ICD-10-CM

## 2019-10-21 DIAGNOSIS — E55.9 VITAMIN D DEFICIENCY, UNSPECIFIED: ICD-10-CM

## 2019-10-21 DIAGNOSIS — Z86.39 HISTORY OF VITAMIN D DEFICIENCY: ICD-10-CM

## 2019-10-21 DIAGNOSIS — R20.2 TINGLING OF BOTH FEET: ICD-10-CM

## 2019-10-21 DIAGNOSIS — Z00.00 WELLNESS EXAMINATION: Primary | ICD-10-CM

## 2019-10-21 DIAGNOSIS — Z86.2 HX OF IRON DEFICIENCY ANEMIA: ICD-10-CM

## 2019-10-21 DIAGNOSIS — Z23 ENCOUNTER FOR IMMUNIZATION: ICD-10-CM

## 2019-10-21 LAB
BILIRUB UR QL STRIP: NEGATIVE
GLUCOSE UR-MCNC: NEGATIVE MG/DL
KETONES P FAST UR STRIP-MCNC: NEGATIVE MG/DL
PH UR STRIP: 6 [PH] (ref 4.6–8)
PROT UR QL STRIP: NEGATIVE
SP GR UR STRIP: 1.01 (ref 1–1.03)
UA UROBILINOGEN AMB POC: NORMAL (ref 0.2–1)
URINALYSIS CLARITY POC: CLEAR
URINALYSIS COLOR POC: YELLOW
URINE BLOOD POC: NEGATIVE
URINE LEUKOCYTES POC: NEGATIVE
URINE NITRITES POC: NEGATIVE

## 2019-10-21 NOTE — PROGRESS NOTES
[address], [address] [facility]  [phone]           Progress Note    Patient: Lane Sherwood MRN: [id number]  SSN: [id number]    YOB: 1955  Age: 75 y.o. Sex: female        Chief Complaint   Patient presents with    Complete Physical         Subjective:     Encounter Diagnoses   Name Primary?  Wellness examination: here for exam.     Yes    Hx of iron deficiency anemia: she does not eat meats. She has a high risk for recurrent anemia.  Cold intolerance: Need to rule out anemia or hyperthyroidism. Body temperature is 1 degrees below normal.         Tingling of both feet: this is a new symptom. Could indicate B12 deficiency.  History of vitamin D deficiency:  No sx. Due for testing. Lab Results   Component Value Date/Time    Vitamin D 25-Hydroxy 34.9 03/07/2011 10:18 AM    VITAMIN D, 25-HYDROXY 43.4 09/27/2016 12:00 AM              Vitamin D deficiency, unspecified: See above. Current and past medical information:    Current Medications after this visit[de-identified]     Current Outpatient Medications   Medication Sig    Lactobac no.41/Bifidobact no.7 (PROBIOTIC-10 PO) Take  by mouth.  Omega-3 Fatty Acids (FISH OIL) 500 mg cap Take  by mouth.  multivitamin (ONE A DAY) tablet Take 1 Tab by mouth daily.  ferrous sulfate 220 mg (44 mg iron)/5 mL solution Take  by mouth daily.  Diclofenac Potassium (CAMBIA) 50 mg pwpk Take 1 Packet by mouth as needed.  L. acidophilus/Strept/La p-talita (NATE-Q PO) Take  by mouth.  digestive enzymes tab Take  by mouth. No current facility-administered medications for this visit.         Patient Active Problem List    Diagnosis Date Noted    Migraine with aura and without status migrainosus, not intractable 12/21/2018    Anemia 07/08/2018    IBS (irritable bowel syndrome) 03/22/2012    Chest discomfort 05/03/2011       Past Medical History:   Diagnosis Date    Anemia NEC     IBS (irritable bowel syndrome)        No Known Allergies    Past Surgical History:   Procedure Laterality Date    DILATION AND CURETTAGE      HX GYN  1979    D&C       Social History     Socioeconomic History    Marital status:      Spouse name: Not on file    Number of children: Not on file    Years of education: Not on file    Highest education level: Not on file   Tobacco Use    Smoking status: Never Smoker    Smokeless tobacco: Never Used   Substance and Sexual Activity    Alcohol use: No    Drug use: No       Review of Systems   Constitutional: Negative. Negative for chills, fever, malaise/fatigue and weight loss. HENT: Negative. Negative for hearing loss. Eyes: Negative. Negative for blurred vision and double vision. Respiratory: Negative. Negative for cough, sputum production and shortness of breath. Cardiovascular: Negative. Negative for chest pain and palpitations. Gastrointestinal: Negative. Negative for abdominal pain, blood in stool, heartburn, nausea and vomiting. Genitourinary: Negative. Negative for dysuria, frequency and urgency. Musculoskeletal: Negative. Negative for back pain, falls, myalgias and neck pain. She has pain over her left ischial tuberosity when sitting on hard chairs. Recommended using cushioning or Aleve when symptoms are active. Warm wet tub soaks would also help. Skin: Negative. Negative for rash. Neurological: Negative. Negative for dizziness, tingling, tremors, weakness and headaches. Endo/Heme/Allergies: Negative. Psychiatric/Behavioral: Negative. Negative for depression. Objective:     Vitals:    10/21/19 0911   BP: 125/65   Pulse: 61   Resp: 16   Temp: 97.5 °F (36.4 °C)   TempSrc: Oral   SpO2: 100%   Weight: 149 lb (67.6 kg)   Height: 5' 8.5\" (1.74 m)      Body mass index is 22.33 kg/m².     Physical Exam   Constitutional: She is oriented to person, place, and time and well-developed, well-nourished, and in no distress. No distress. HENT:   Head: Normocephalic and atraumatic. Mouth/Throat: Oropharynx is clear and moist.   Eyes: Conjunctivae are normal.   Neck: No thyromegaly present. No carotid bruit. Cardiovascular: Normal rate, regular rhythm and normal heart sounds. Exam reveals no friction rub. No murmur heard. Pulmonary/Chest: Effort normal and breath sounds normal. No respiratory distress. She has no wheezes. She has no rales. Abdominal: Soft. She exhibits no distension. Musculoskeletal: She exhibits tenderness. She exhibits no edema. Tenderness of her left ischial tuberosity. Neurological: She is alert and oriented to person, place, and time. Skin: Skin is warm. No rash noted. She is not diaphoretic. No erythema. Psychiatric: Mood and affect normal.   Nursing note and vitals reviewed. Health Maintenance Due   Topic Date Due    GLAUCOMA SCREENING Q2Y  05/18/2018    Influenza Age 5 to Adult  08/01/2019    MEDICARE YEARLY EXAM  08/06/2019    Pneumococcal 65+ years (2 of 2 - PPSV23) 08/28/2019         Assessment and orders:     Encounter Diagnoses     ICD-10-CM ICD-9-CM   1. Wellness examination Z00.00 V70.0   2. Hx of iron deficiency anemia Z86.2 V12.3   3. Cold intolerance R68.89 780.99   4. Tingling of both feet R20.2 782.0   5. History of vitamin D deficiency Z86.39 V12.1   6. Vitamin D deficiency, unspecified  E55.9 268.9     Diagnoses and all orders for this visit:    1. Wellness examination-  overal according to third day today is doing well. -     CBC WITH AUTOMATED DIFF  -     METABOLIC PANEL, COMPREHENSIVE  -     LIPID PANEL  -     IRON PROFILE  -     AMB POC URINALYSIS DIP STICK AUTO W/O MICRO  -     TSH 3RD GENERATION  -     T4, FREE    2. Hx of iron deficiency anemia-retest  -     IRON PROFILE    3. Cold intolerance-test thyroid  -     TSH 3RD GENERATION  -     T4, FREE    4. Tingling of both feet-new symptom  -     VITAMIN B12    5.  History of vitamin D deficiency  -     VITAMIN D, 25 HYDROXY    6. Vitamin D deficiency, unspecified-due for repeat. -     VITAMIN D, 25 HYDROXY    7. Encounter for immunization. Flu vaccine today and Pneumovax 23 in 2 weeks. Plan of care:  Discussed diagnoses in detail with patient. Medication risks/benefits/side effects discussed with patient. All of the patient's questions were addressed. The patient understands and agrees with our plan of care. The patient knows to call back if they are unsure of or forget any changes we discussed today or if the symptoms change. The patient received an After-Visit Summary which contains VS, orders, medication list and allergy list. This can be used as a \"mini-medical record\" should they have to seek medical care while out of town. Patient Care Team:  Alvarado Funez MD as PCP - Jurgen Cagle MD (Dermatology)  Toño Anders MD (Rheumatology)    Follow-up and Dispositions    · Return in about 6 months (around 4/21/2020) for 2 weeks pneumonia 23. No future appointments. Signed By: Toyin Christianson MD     October 21, 2019      ATTENTION:   This medical record was transcribed using an electronic medical records/speech recognition system. Although proofread, it may and can contain electronic, spelling and other errors. Corrections may be executed at a later time. Please feel free to contact me for any clarifications as needed.

## 2019-10-21 NOTE — PROGRESS NOTES
1. Have you been to the ER, urgent care clinic since your last visit? Hospitalized since your last visit? No    2. Have you seen or consulted any other health care providers outside of the [address] [place] [address] since your last visit? Include any pap smears or colon screening.  No  Reviewed record in preparation for visit and have necessary documentation  Pt did not bring medication to office visit for review    Goals that were addressed and/or need to be completed during or after this appointment include     Health Maintenance Due   Topic Date Due    GLAUCOMA SCREENING Q2Y  05/18/2018    Influenza Age 5 to Adult  08/01/2019    MEDICARE YEARLY EXAM  08/06/2019    Pneumococcal 65+ years (2 of 2 - PPSV23) 08/28/2019

## 2019-10-21 NOTE — PATIENT INSTRUCTIONS
Well Visit, Over 72: Care Instructions  Your Care Instructions    Physical exams can help you stay healthy. Your doctor has checked your overall health and may have suggested ways to take good care of yourself. He or she also may have recommended tests. At home, you can help prevent illness with healthy eating, regular exercise, and other steps. Follow-up care is a key part of your treatment and safety. Be sure to make and go to all appointments, and call your doctor if you are having problems. It's also a good idea to know your test results and keep a list of the medicines you take. How can you care for yourself at home? · Reach and stay at a healthy weight. This will lower your risk for many problems, such as obesity, diabetes, heart disease, and high blood pressure. · Get at least 30 minutes of exercise on most days of the week. Walking is a good choice. You also may want to do other activities, such as running, swimming, cycling, or playing tennis or team sports. · Do not smoke. Smoking can make health problems worse. If you need help quitting, talk to your doctor about stop-smoking programs and medicines. These can increase your chances of quitting for good. · Protect your skin from too much sun. When you're outdoors from 10 a.m. to 4 p.m., stay in the shade or cover up with clothing and a hat with a wide brim. Wear sunglasses that block UV rays. Even when it's cloudy, put broad-spectrum sunscreen (SPF 30 or higher) on any exposed skin. · See a dentist one or two times a year for checkups and to have your teeth cleaned. · Wear a seat belt in the car. Follow your doctor's advice about when to have certain tests. These tests can spot problems early. For men and women  · Cholesterol. Your doctor will tell you how often to have this done based on your overall health and other things that can increase your risk for heart attack and stroke. · Blood pressure.  Have your blood pressure checked during a routine doctor visit. Your doctor will tell you how often to check your blood pressure based on your age, your blood pressure results, and other factors. · Diabetes. Ask your doctor whether you should have tests for diabetes. · Vision. Experts recommend that you have yearly exams for glaucoma and other age-related eye problems. · Hearing. Tell your doctor if you notice any change in your hearing. You can have tests to find out how well you hear. · Colon cancer tests. Keep having colon cancer tests as your doctor recommends. You can have one of several types of tests. · Heart attack and stroke risk. At least every 4 to 6 years, you should have your risk for heart attack and stroke assessed. Your doctor uses factors such as your age, blood pressure, cholesterol, and whether you smoke or have diabetes to show what your risk for a heart attack or stroke is over the next 10 years. · Osteoporosis. Talk to your doctor about whether you should have a bone density test to find out whether you have thinning bones. Also ask your doctor about whether you should take calcium and vitamin D supplements. For women  · Pap test and pelvic exam. You may no longer need a Pap test. Talk with your doctor about whether to stop or continue to have Pap tests. · Breast exam and mammogram. Ask how often you should have a mammogram, which is an X-ray of your breasts. A mammogram can spot breast cancer before it can be felt and when it is easiest to treat. · Thyroid disease. Talk to your doctor about whether to have your thyroid checked as part of a regular physical exam. Women have an increased chance of a thyroid problem. For men  · Prostate exam. Talk to your doctor about whether you should have a blood test (called a PSA test) for prostate cancer.  Experts recommend that you discuss the benefits and risks of the test with your doctor before you decide whether to have this test. Some experts say that men ages 79 and older no longer need testing. · Abdominal aortic aneurysm. Ask your doctor whether you should have a test to check for an aneurysm. You may need a test if you ever smoked or if your parent, brother, sister, or child has had an aneurysm. When should you call for help? Watch closely for changes in your health, and be sure to contact your doctor if you have any problems or symptoms that concern you. Where can you learn more? Go to http://ambrosio-hero.info/. Enter I300 in the search box to learn more about \"Well Visit, Over 65: Care Instructions. \"  Current as of: December 13, 2018  Content Version: 12.2  © 0516-0982 [employer]. Care instructions adapted under license by [employer] (which disclaims liability or warranty for this information). If you have questions about a medical condition or this instruction, always ask your healthcare professional. [address] any warranty or liability for your use of this information.

## 2019-10-22 LAB
25(OH)D3+25(OH)D2 SERPL-MCNC: 30.5 NG/ML (ref 30–100)
ALBUMIN SERPL-MCNC: 4.3 G/DL (ref 3.6–4.8)
ALBUMIN/GLOB SERPL: 2.3 {RATIO} (ref 1.2–2.2)
ALP SERPL-CCNC: 52 IU/L (ref 39–117)
ALT SERPL-CCNC: 14 IU/L (ref 0–32)
AST SERPL-CCNC: 22 IU/L (ref 0–40)
BASOPHILS # BLD AUTO: 0 X10E3/UL (ref 0–0.2)
BASOPHILS NFR BLD AUTO: 1 %
BILIRUB SERPL-MCNC: 0.7 MG/DL (ref 0–1.2)
BUN SERPL-MCNC: 14 MG/DL (ref 8–27)
BUN/CREAT SERPL: 22 (ref 12–28)
CALCIUM SERPL-MCNC: 9.5 MG/DL (ref 8.7–10.3)
CHLORIDE SERPL-SCNC: 104 MMOL/L (ref 96–106)
CHOLEST SERPL-MCNC: 150 MG/DL (ref 100–199)
CO2 SERPL-SCNC: 24 MMOL/L (ref 20–29)
CREAT SERPL-MCNC: 0.64 MG/DL (ref 0.57–1)
EOSINOPHIL # BLD AUTO: 0 X10E3/UL (ref 0–0.4)
EOSINOPHIL NFR BLD AUTO: 1 %
ERYTHROCYTE [DISTWIDTH] IN BLOOD BY AUTOMATED COUNT: 12.5 % (ref 12.3–15.4)
GLOBULIN SER CALC-MCNC: 1.9 G/DL (ref 1.5–4.5)
GLUCOSE SERPL-MCNC: 91 MG/DL (ref 65–99)
HCT VFR BLD AUTO: 36.4 % (ref 34–46.6)
HDLC SERPL-MCNC: 60 MG/DL
HGB BLD-MCNC: 12.1 G/DL (ref 11.1–15.9)
IMM GRANULOCYTES # BLD AUTO: 0 X10E3/UL (ref 0–0.1)
IMM GRANULOCYTES NFR BLD AUTO: 0 %
IRON SATN MFR SERPL: 28 % (ref 15–55)
IRON SERPL-MCNC: 101 UG/DL (ref 27–139)
LDLC SERPL CALC-MCNC: 79 MG/DL (ref 0–99)
LYMPHOCYTES # BLD AUTO: 1.1 X10E3/UL (ref 0.7–3.1)
LYMPHOCYTES NFR BLD AUTO: 36 %
MCH RBC QN AUTO: 29.7 PG (ref 26.6–33)
MCHC RBC AUTO-ENTMCNC: 33.2 G/DL (ref 31.5–35.7)
MCV RBC AUTO: 89 FL (ref 79–97)
MONOCYTES # BLD AUTO: 0.4 X10E3/UL (ref 0.1–0.9)
MONOCYTES NFR BLD AUTO: 12 %
NEUTROPHILS # BLD AUTO: 1.5 X10E3/UL (ref 1.4–7)
NEUTROPHILS NFR BLD AUTO: 50 %
PLATELET # BLD AUTO: 254 X10E3/UL (ref 150–450)
POTASSIUM SERPL-SCNC: 4.1 MMOL/L (ref 3.5–5.2)
PROT SERPL-MCNC: 6.2 G/DL (ref 6–8.5)
RBC # BLD AUTO: 4.08 X10E6/UL (ref 3.77–5.28)
SODIUM SERPL-SCNC: 142 MMOL/L (ref 134–144)
T4 FREE SERPL-MCNC: 0.98 NG/DL (ref 0.82–1.77)
TIBC SERPL-MCNC: 355 UG/DL (ref 250–450)
TRIGL SERPL-MCNC: 54 MG/DL (ref 0–149)
TSH SERPL DL<=0.005 MIU/L-ACNC: 2.16 UIU/ML (ref 0.45–4.5)
UIBC SERPL-MCNC: 254 UG/DL (ref 118–369)
VIT B12 SERPL-MCNC: 812 PG/ML (ref 232–1245)
VLDLC SERPL CALC-MCNC: 11 MG/DL (ref 5–40)
WBC # BLD AUTO: 3 X10E3/UL (ref 3.4–10.8)

## 2019-12-24 ENCOUNTER — TELEPHONE (OUTPATIENT)
Dept: FAMILY MEDICINE CLINIC | Age: 64
End: 2019-12-24

## 2019-12-24 RX ORDER — OSELTAMIVIR PHOSPHATE 75 MG/1
75 CAPSULE ORAL DAILY
Qty: 5 CAP | Refills: 1 | Status: SHIPPED | OUTPATIENT
Start: 2019-12-24 | End: 2019-12-29

## 2019-12-24 NOTE — TELEPHONE ENCOUNTER
Pt called and left message with a Eneida and was calling back. Pt states that one of the [facility] children have the flu. The request is for Tamaflu for Stephanie Menchaca and Dorian Dandy. She states that Dr. Radha Orr will know what she needs. There also is an infant in the home and a child with medical challenge condition. She wants to know will this be helpful. [phone]. [facility]. Asking Nurse May to please call Pt back.  Thanks

## 2019-12-24 NOTE — TELEPHONE ENCOUNTER
Returned phone call to patient, advised her of the following: Dr. Fco Valencia says that he will send in Tamiflu for [facility] but he can not send it in for her children since he has not seen them in more than two years. Dottie would like to use [facility].

## 2020-10-16 ENCOUNTER — TRANSCRIBE ORDER (OUTPATIENT)
Dept: SCHEDULING | Age: 65
End: 2020-10-16

## 2020-10-16 DIAGNOSIS — Z12.31 VISIT FOR SCREENING MAMMOGRAM: Primary | ICD-10-CM

## 2020-11-12 ENCOUNTER — TRANSCRIBE ORDER (OUTPATIENT)
Dept: SCHEDULING | Age: 65
End: 2020-11-12

## 2020-11-12 DIAGNOSIS — M81.0 POST-MENOPAUSAL OSTEOPOROSIS: Primary | ICD-10-CM

## 2020-12-18 ENCOUNTER — HOSPITAL ENCOUNTER (OUTPATIENT)
Dept: MAMMOGRAPHY | Age: 65
Discharge: HOME | End: 2020-12-18
Attending: FAMILY MEDICINE
Payer: MEDICARE

## 2020-12-18 DIAGNOSIS — Z12.31 VISIT FOR SCREENING MAMMOGRAM: ICD-10-CM

## 2020-12-18 PROCEDURE — 77067 SCR MAMMO BI INCL CAD: CPT

## 2021-01-13 ENCOUNTER — HOSPITAL ENCOUNTER (OUTPATIENT)
Dept: MAMMOGRAPHY | Age: 66
Discharge: HOME | End: 2021-01-13
Attending: FAMILY MEDICINE
Payer: MEDICARE

## 2021-01-13 ENCOUNTER — APPOINTMENT (OUTPATIENT)
Dept: MAMMOGRAPHY | Age: 66
End: 2021-01-13
Attending: FAMILY MEDICINE
Payer: MEDICARE

## 2021-01-13 DIAGNOSIS — M81.0 POST-MENOPAUSAL OSTEOPOROSIS: ICD-10-CM

## 2021-01-13 PROCEDURE — 77080 DXA BONE DENSITY AXIAL: CPT

## 2021-12-06 ENCOUNTER — OFFICE VISIT (OUTPATIENT)
Dept: FAMILY MEDICINE CLINIC | Age: 66
End: 2021-12-06
Payer: MEDICARE

## 2021-12-06 VITALS
OXYGEN SATURATION: 97 % | SYSTOLIC BLOOD PRESSURE: 114 MMHG | HEART RATE: 69 BPM | BODY MASS INDEX: 22.96 KG/M2 | DIASTOLIC BLOOD PRESSURE: 63 MMHG | HEIGHT: 69 IN | WEIGHT: 155 LBS | RESPIRATION RATE: 16 BRPM | TEMPERATURE: 98.2 F

## 2021-12-06 DIAGNOSIS — D51.0 VITAMIN B12 DEFICIENCY ANEMIA DUE TO INTRINSIC FACTOR DEFICIENCY: ICD-10-CM

## 2021-12-06 DIAGNOSIS — Z86.2 HISTORY OF ANEMIA: ICD-10-CM

## 2021-12-06 DIAGNOSIS — H61.22 IMPACTED CERUMEN OF LEFT EAR: ICD-10-CM

## 2021-12-06 DIAGNOSIS — E55.9 VITAMIN D DEFICIENCY: ICD-10-CM

## 2021-12-06 DIAGNOSIS — E53.8 VITAMIN B12 DEFICIENCY: ICD-10-CM

## 2021-12-06 DIAGNOSIS — D72.819 LEUKOPENIA, UNSPECIFIED TYPE: ICD-10-CM

## 2021-12-06 DIAGNOSIS — M81.0 AGE-RELATED OSTEOPOROSIS WITHOUT CURRENT PATHOLOGICAL FRACTURE: ICD-10-CM

## 2021-12-06 DIAGNOSIS — Z00.00 MEDICARE ANNUAL WELLNESS VISIT, SUBSEQUENT: Primary | ICD-10-CM

## 2021-12-06 DIAGNOSIS — Z23 ENCOUNTER FOR IMMUNIZATION: ICD-10-CM

## 2021-12-06 DIAGNOSIS — Z20.822 EXPOSURE TO COVID-19 VIRUS: ICD-10-CM

## 2021-12-06 PROCEDURE — 1101F PT FALLS ASSESS-DOCD LE1/YR: CPT | Performed by: FAMILY MEDICINE

## 2021-12-06 PROCEDURE — 69209 REMOVE IMPACTED EAR WAX UNI: CPT | Performed by: FAMILY MEDICINE

## 2021-12-06 PROCEDURE — 3017F COLORECTAL CA SCREEN DOC REV: CPT | Performed by: FAMILY MEDICINE

## 2021-12-06 PROCEDURE — G0439 PPPS, SUBSEQ VISIT: HCPCS | Performed by: FAMILY MEDICINE

## 2021-12-06 PROCEDURE — 90694 VACC AIIV4 NO PRSRV 0.5ML IM: CPT | Performed by: FAMILY MEDICINE

## 2021-12-06 PROCEDURE — G8427 DOCREV CUR MEDS BY ELIG CLIN: HCPCS | Performed by: FAMILY MEDICINE

## 2021-12-06 PROCEDURE — 1090F PRES/ABSN URINE INCON ASSESS: CPT | Performed by: FAMILY MEDICINE

## 2021-12-06 PROCEDURE — G8510 SCR DEP NEG, NO PLAN REQD: HCPCS | Performed by: FAMILY MEDICINE

## 2021-12-06 PROCEDURE — G8420 CALC BMI NORM PARAMETERS: HCPCS | Performed by: FAMILY MEDICINE

## 2021-12-06 PROCEDURE — 99214 OFFICE O/P EST MOD 30 MIN: CPT | Performed by: FAMILY MEDICINE

## 2021-12-06 PROCEDURE — G0008 ADMIN INFLUENZA VIRUS VAC: HCPCS | Performed by: FAMILY MEDICINE

## 2021-12-06 PROCEDURE — G9899 SCRN MAM PERF RSLTS DOC: HCPCS | Performed by: FAMILY MEDICINE

## 2021-12-06 PROCEDURE — G8536 NO DOC ELDER MAL SCRN: HCPCS | Performed by: FAMILY MEDICINE

## 2021-12-06 RX ORDER — MELATONIN
DAILY
COMMUNITY
Start: 2021-12-06

## 2021-12-06 RX ORDER — ASCORBIC ACID 500 MG
1000 TABLET ORAL
COMMUNITY
Start: 2021-12-06

## 2021-12-06 NOTE — PROGRESS NOTES
This is the Subsequent Medicare Annual Wellness Exam, performed 12 months or more after the Initial AWV or the last Subsequent AWV    I have reviewed the patient's medical history in detail and updated the computerized patient record. Assessment/Plan   Education and counseling provided:  Are appropriate based on today's review and evaluation  Pneumococcal Vaccine - pt declines  COVID vaccine - pt declines    1. Medicare annual wellness visit, subsequent: Pt also requesting labs that she has been getting drawn yearly.   -     LIPID PANEL; Future  -     METABOLIC PANEL, BASIC; Future  2. Vitamin D deficiency  -     VITAMIN D, 25 HYDROXY; Future  3. Vitamin B12 deficiency  -     VITAMIN B12; Future  4. History of anemia  -     IRON PROFILE; Future  5. Leukopenia, unspecified type  -     TSH 3RD GENERATION; Future  -     T4, FREE; Future  -     CBC W/O DIFF; Future  6. Encounter for immunization  -     ADMIN INFLUENZA VIRUS VAC  -     FLU (FLUAD QUAD INFLUENZA VACCINE,QUAD,ADJUVANTED)  7. Exposure to COVID-19 virus  -     SARS-COV-2 AB, IGG  8. Vitamin B12 deficiency anemia due to intrinsic factor deficiency   -     TSH 3RD GENERATION; Future  -     T4, FREE; Future  9. Age-related osteoporosis without current pathological fracture: Reviewed prior DEXA scan with pt and finding of osteoporosis. Discussed recommendations and rationale for treating, risks of not treating. She is not interested in medication for this at the current time but will let us know if she changes her mind.      10. Cerumen impaction of R ear: Ear lavage performed today per pt request.        Depression Risk Factor Screening     3 most recent PHQ Screens 12/6/2021   Little interest or pleasure in doing things Not at all   Feeling down, depressed, irritable, or hopeless Not at all   Total Score PHQ 2 0       Alcohol Risk Screen    Do you average more than 1 drink per night or more than 7 drinks a week:  No    On any one occasion in the past three months have you have had more than 3 drinks containing alcohol:  No        Functional Ability and Level of Safety    Hearing: Hearing is good. Activities of Daily Living: The home contains: handrails  Patient does total self care      Ambulation: with no difficulty     Fall Risk:  Fall Risk Assessment, last 12 mths 12/6/2021   Able to walk? Yes   Fall in past 12 months? 0   Do you feel unsteady? 0   Are you worried about falling 0   Number of falls in past 12 months -   Fall with injury? -      Abuse Screen:  Patient is not abused       Cognitive Screening    Has your family/caregiver stated any concerns about your memory: no     Cognitive Screening: Normal - Mini Cog Test    Health Maintenance Due     Health Maintenance Due   Topic Date Due    COVID-19 Vaccine (1) Never done    Pneumococcal 65+ years (2 of 2 - PPSV23) 08/28/2019       Patient Care Team   Patient Care Team:  Esvin Ramos MD as PCP - General  Felicia Buchanan MD (Dermatology)  Bradley Andrade MD (Rheumatology)    History     Patient Active Problem List   Diagnosis Code    Chest discomfort R07.89    IBS (irritable bowel syndrome) K58.9    Anemia D64.9    Migraine with aura and without status migrainosus, not intractable G43.109     Past Medical History:   Diagnosis Date    Anemia NEC     IBS (irritable bowel syndrome)       Past Surgical History:   Procedure Laterality Date    DILATION AND CURETTAGE      HX GYN  1979    D&C     Current Outpatient Medications   Medication Sig Dispense Refill    OTHER Black elderberry gummie ,vitc,zinc 100mg      cholecalciferol (VITAMIN D3) (1000 Units /25 mcg) tablet Take  by mouth daily. Vit d 50mcg,vit k 200mcg      ascorbic acid, vitamin C, (Vitamin C) 500 mg tablet Take 2 Tablets by mouth.  OTHER borox 3 mg  Broken cell wall chlorella, horactail grass 440mg      Diclofenac Potassium (CAMBIA) 50 mg pwpk Take 1 Packet by mouth as needed.  1 Packet 0    Omega-3 Fatty Acids (FISH OIL) 500 mg cap Take  by mouth.  multivitamin (ONE A DAY) tablet Take 1 Tab by mouth daily.  digestive enzymes tab Take  by mouth.  Lactobac no.41/Bifidobact no.7 (PROBIOTIC-10 PO) Take  by mouth. Megaspore biotic 340 mg      ferrous sulfate 220 mg (44 mg iron)/5 mL solution Take  by mouth daily. (Patient not taking: Reported on 12/6/2021)      L. acidophilus/Strept/La p-talita (NATE-Q PO) Take  by mouth.  (Patient not taking: Reported on 12/6/2021)       No Known Allergies    Family History   Problem Relation Age of Onset    Heart Disease Mother     Hypertension Mother     Dementia Mother     Stroke Father     Diabetes Father     Diabetes Brother     Cancer Brother         leukermia    Diabetes Paternal Aunt     Diabetes Paternal Uncle     Cancer Maternal Grandmother         leukemia    Diabetes Paternal Grandfather      Social History     Tobacco Use    Smoking status: Never Smoker    Smokeless tobacco: Never Used   Substance Use Topics    Alcohol use: No         Brayan Farrell MD

## 2021-12-06 NOTE — PATIENT INSTRUCTIONS
Medicare Wellness Visit, Female     The best way to live healthy is to have a lifestyle where you eat a well-balanced diet, exercise regularly, limit alcohol use, and quit all forms of tobacco/nicotine, if applicable. Regular preventive services are another way to keep healthy. Preventive services (vaccines, screening tests, monitoring & exams) can help personalize your care plan, which helps you manage your own care. Screening tests can find health problems at the earliest stages, when they are easiest to treat. [facility] follows the current, evidence-based guidelines published by the [facility] ([facility]) when recommending preventive services for our patients. Because we follow these guidelines, sometimes recommendations change over time as research supports it. (For example, mammograms used to be recommended annually. Even though Medicare will still pay for an annual mammogram, the newer guidelines recommend a mammogram every two years for women of average risk). Of course, you and your doctor may decide to screen more often for some diseases, based on your risk and your co-morbidities (chronic disease you are already diagnosed with). Preventive services for you include:  - Medicare offers their members a free annual wellness visit, which is time for you and your primary care provider to discuss and plan for your preventive service needs. Take advantage of this benefit every year!  -All adults over the age of 72 should receive the recommended pneumonia vaccines. Current USPSTF guidelines recommend a series of two vaccines for the best pneumonia protection.   -All adults should have a flu vaccine yearly and a tetanus vaccine every 10 years.   -All adults age 48 and older should receive the shingles vaccines (series of two vaccines).       -All adults age 38-68 who are overweight should have a diabetes screening test once every three years.   -All adults born between [birth year] and 1965 should be screened once for Hepatitis C.  -Other screening tests and preventive services for persons with diabetes include: an eye exam to screen for diabetic retinopathy, a kidney function test, a foot exam, and stricter control over your cholesterol.   -Cardiovascular screening for adults with routine risk involves an electrocardiogram (ECG) at intervals determined by your doctor.   -Colorectal cancer screenings should be done for adults age 54-65 with no increased risk factors for colorectal cancer. There are a number of acceptable methods of screening for this type of cancer. Each test has its own benefits and drawbacks. Discuss with your doctor what is most appropriate for you during your annual wellness visit. The different tests include: colonoscopy (considered the best screening method), a fecal occult blood test, a fecal DNA test, and sigmoidoscopy.    -A bone mass density test is recommended when a woman turns 65 to screen for osteoporosis. This test is only recommended one time, as a screening. Some providers will use this same test as a disease monitoring tool if you already have osteoporosis. -Breast cancer screenings are recommended every other year for women of normal risk, age 54-69.  -Cervical cancer screenings for women over age 72 are only recommended with certain risk factors. Here is a list of your current Health Maintenance items (your personalized list of preventive services) with a due date:  Health Maintenance Due   Topic Date Due    COVID-19 Vaccine (1) Never done    Pneumococcal Vaccine (2 of 2 - PPSV23) 08/28/2019    Yearly Flu Vaccine (1) 09/01/2021            Pneumococcal Polysaccharide Vaccine: What You Need to Know  Why get vaccinated? Pneumococcal polysaccharide vaccine (PPSV23) can prevent pneumococcal disease. Pneumococcal disease refers to any illness caused by pneumococcal bacteria.  These bacteria can cause many types of illnesses, including pneumonia, which is an infection of the lungs. Pneumococcal bacteria are one of the most common causes of pneumonia. Besides pneumonia, pneumococcal bacteria can also cause:  · Ear infections,  · Sinus infections  · Meningitis (infection of the tissue covering the brain and spinal cord)  · Bacteremia (bloodstream infection)  Anyone can get pneumococcal disease, but children under 3years of age, people with certain medical conditions, adults 72 years or older, and cigarette smokers are at the highest risk. Most pneumococcal infections are mild. However, some can result in long-term problems, such as brain damage or hearing loss. Meningitis, bacteremia, and pneumonia caused by pneumococcal disease can be fatal.  PPSV23  PPSV23 protects against 23 types of bacteria that cause pneumococcal disease. PPSV23 is recommended for:  · All adults 72 years or older,  · Anyone 2 years or older with certain medical conditions that can lead to an increased risk for pneumococcal disease. Most people need only one dose of PPSV23. A second dose of PPSV23, and another type of pneumococcal vaccine called PCV13, are recommended for certain high-risk groups. Your health care provider can give you more information. People 65 years or older should get a dose of PPSV23 even if they have already gotten one or more doses of the vaccine before they turned 65. Talk with your health care provider  Tell your vaccine provider if the person getting the vaccine:  · Has had an allergic reaction after a previous dose of PPSV23, or has any severe, life-threatening allergies. In some cases, your health care provider may decide to postpone PPSV23 vaccination to a future visit. People with minor illnesses, such as a cold, may be vaccinated. People who are moderately or severely ill should usually wait until they recover before getting PPSV23. Your health care provider can give you more information.   Risks of a vaccine reaction  · Redness or pain where the shot is given, feeling tired, fever, or muscle aches can happen after PPSV23. People sometimes faint after medical procedures, including vaccination. Tell your provider if you feel dizzy or have vision changes or ringing in the ears. As with any medicine, there is a very remote chance of a vaccine causing a severe allergic reaction, other serious injury, or death. What if there is a serious problem? An allergic reaction could occur after the vaccinated person leaves the clinic. If you see signs of a severe allergic reaction (hives, swelling of the face and throat, difficulty breathing, a fast heartbeat, dizziness, or weakness), call 9-1-1 and get the person to the nearest hospital.  For other signs that concern you, call your health care provider. Adverse reactions should be reported to the Vaccine Adverse Event Reporting System (VAERS). Your health care provider will usually file this report, or you can do it yourself. Visit the VAERS website at www.vaers. hhs.gov at www.vaers. hhs.gov or call 6-282.923.5198. VAERS is only for reporting reactions, and VAERS staff do not give medical advice. How can I learn more? · Ask your health care provider. · Call your local or state health department. · Contact the Centers for Disease Control and Prevention (CDC):  ? Call 0-467.790.5953 (1-800-CDC-INFO) or  ? Visit CDC's website at www.cdc.gov/vaccines  Vaccine Information Statement  PPSV23 Vaccine  10/30/2019  [facility] and [facility] for Disease Control and Prevention  Many Vaccine Information Statements are available in [place] and other languages. See www.immunize.org/vis. Hojas de información Sobre Vacunas están disponibles en español y en muchos otros idiomas. Visite Amy.si. Care instructions adapted under license by [place] (which disclaims liability or warranty for this information).  If you have questions about a medical condition or this instruction, always ask your healthcare professional. [address] any warranty or liability for your use of this information. Deciding About Bisphosphonate Medicine for Osteoporosis  How can you decide about taking bisphosphonate medicine for osteoporosis? This information is right for you if you are a woman who has been through menopause. If that does not describe you, or if you're not sure, talk with your doctor about this decision. What is osteoporosis? Osteoporosis is a disease that affects your bones. It means you have bones that are thin and brittle and have lots of holes inside them like a sponge. This makes them easy to break. It can lead to broken bones (fractures), especially in the hip, spine, and wrist. These fractures may make it hard for you to live on your own. Bisphosphonates are the most common medicines used to prevent bone loss. They may be taken as a pill or an injection into a vein. Bisphosphonates slow the way bone dissolves and is absorbed by your body. They can increase bone thickness and strength. What are key points about this decision? · If you are at a higher risk of having a fracture, taking bisphosphonates is more likely to help you prevent a fracture. If your risk of a fracture is lower, it's less likely that these medicines will help you. · Bisphosphonates can cause problems with the jaw or thigh bone. But most women do not have these side effects. · Whether you take medicine or not, healthy habits can help protect your bones. Get enough calcium and vitamin D. Get regular weight-bearing exercise. Cut back on alcohol. And if you smoke, quit. Who is helped the most by bisphosphonates? For women who have been through menopause:  · If you have osteoporosis (your T-score is -2.5 or less) or you have had a fracture, taking bisphosphonates lowers your risk of having a fracture.   · If you haven't had a fracture and you have low bone density (your T-score is between -1.0 and -2.5, sometimes called osteopenia), taking bisphosphonates might lower your risk of having a fracture. This evidence is not as strong. What are the side effects of bisphosphonates? These medicines can have side effects, such as heartburn and belly pain. Certain bone problems have also been reported in women taking bisphosphonates. Out of 1,000 people, about 1 person has a bone side effect during a year of taking bisphosphonates. That means 999 out of 1,000 people do not have a bone side effect. These bone side effects include problems with the jaw bone (called osteonecrosis). They also might include a certain kind of fracture of the thigh bone (called an atypical fracture), but more research is needed to find out if taking bisphosphonates is a cause of these fractures. Your decision  Thinking about the facts and your feelings can help you make a decision that is right for you. Be sure you understand the benefits and risks of your options, and think about what else you need to do before you make the decision. Where can you learn more? Go to http://ambrosio-hero.info/  Enter [id number] in the search box to learn more about \"Deciding About Bisphosphonate Medicine for Osteoporosis. \"  Current as of: December 7, 2020               Content Version: 13.0  © 2006-2021 Healthwise, Inc[employer]. Care instructions adapted under license by [employer] (which disclaims liability or warranty for this information). If you have questions about a medical condition or this instruction, always ask your healthcare professional. [place] [address] any warranty or liability for your use of this information.

## 2021-12-06 NOTE — PROGRESS NOTES
1. Have you been to the ER, urgent care clinic since your last visit? Hospitalized since your last visit? No    2. Have you seen or consulted any other health care providers outside of the [address] [place] [address] since your last visit? Include any pap smears or colon screening.  No  Reviewed record in preparation for visit and have necessary documentation  Pt did not bring medication to office visit for review    Goals that were addressed and/or need to be completed during or after this appointment include   Health Maintenance Due   Topic Date Due    COVID-19 Vaccine (1) Never done    Medicare Yearly Exam  Never done    Pneumococcal 65+ years (2 of 2 - PPSV23) 08/28/2019    Flu Vaccine (1) 09/01/2021

## 2021-12-07 LAB
25(OH)D3 SERPL-MCNC: 30.2 NG/ML (ref 30–100)
ANION GAP SERPL CALC-SCNC: 4 MMOL/L (ref 5–15)
BUN SERPL-MCNC: 16 MG/DL (ref 6–20)
BUN/CREAT SERPL: 27 (ref 12–20)
CALCIUM SERPL-MCNC: 9.7 MG/DL (ref 8.5–10.1)
CHLORIDE SERPL-SCNC: 108 MMOL/L (ref 97–108)
CHOLEST SERPL-MCNC: 150 MG/DL
CO2 SERPL-SCNC: 28 MMOL/L (ref 21–32)
CREAT SERPL-MCNC: 0.6 MG/DL (ref 0.55–1.02)
ERYTHROCYTE [DISTWIDTH] IN BLOOD BY AUTOMATED COUNT: 12.7 % (ref 11.5–14.5)
GLUCOSE SERPL-MCNC: 107 MG/DL (ref 65–100)
HCT VFR BLD AUTO: 37.2 % (ref 35–47)
HDLC SERPL-MCNC: 66 MG/DL
HDLC SERPL: 2.3 {RATIO} (ref 0–5)
HGB BLD-MCNC: 12.2 G/DL (ref 11.5–16)
IRON SATN MFR SERPL: 15 % (ref 20–50)
IRON SERPL-MCNC: 64 UG/DL (ref 35–150)
LDLC SERPL CALC-MCNC: 70.4 MG/DL (ref 0–100)
MCH RBC QN AUTO: 30.3 PG (ref 26–34)
MCHC RBC AUTO-ENTMCNC: 32.8 G/DL (ref 30–36.5)
MCV RBC AUTO: 92.5 FL (ref 80–99)
NRBC # BLD: 0 K/UL (ref 0–0.01)
NRBC BLD-RTO: 0 PER 100 WBC
PLATELET # BLD AUTO: 293 K/UL (ref 150–400)
PMV BLD AUTO: 9 FL (ref 8.9–12.9)
POTASSIUM SERPL-SCNC: 4.3 MMOL/L (ref 3.5–5.1)
RBC # BLD AUTO: 4.02 M/UL (ref 3.8–5.2)
SODIUM SERPL-SCNC: 140 MMOL/L (ref 136–145)
T4 FREE SERPL-MCNC: 0.9 NG/DL (ref 0.8–1.5)
TIBC SERPL-MCNC: 416 UG/DL (ref 250–450)
TRIGL SERPL-MCNC: 68 MG/DL (ref ?–150)
TSH SERPL DL<=0.05 MIU/L-ACNC: 2.38 UIU/ML (ref 0.36–3.74)
VIT B12 SERPL-MCNC: 1335 PG/ML (ref 193–986)
VLDLC SERPL CALC-MCNC: 13.6 MG/DL
WBC # BLD AUTO: 3.6 K/UL (ref 3.6–11)

## 2021-12-07 NOTE — PROGRESS NOTES
CC: f/u chronic conditions, impacted cerumen and foot lesion    HPI: Pt is a 74 y.o. female who presents for f/u vitamin D deficiency, iron deficiency anemia, osteoporosis, impacted cerumen and lesion on foot. Pt has h/o vitamin D deficiency and is on a daily supplement. Per chart review her levels have been normal recently but she reports she was seen by a different doctor last year and had levels checked that were low so she was started on a higher dose. She ran out of supplement at one point but is back to taking a daily dose now. She has a remote h/o iron deficiency anemia but recent levels have been normal. She is requesting it be checked today. She denies any sonam bleeding. She has a h/o leukopenia that has been stable. She would like the levels rechecked. Chart review shows that she had a DEXA scan earlier this year that showed osteoporosis. She does not think she discussed this with the ordering provider but states she is taking a supplement that is supposed to help with bone health. She has been getting a lot of wax in her L ear and would like to have it cleaned out. She has a spot on her L foot that has been there for a while and seems to be getting bigger. It is not painful or itchy and hasn't drained anything.        Past Medical History:   Diagnosis Date    Anemia NEC     IBS (irritable bowel syndrome)     Leukopenia     Osteoporosis     Vitamin B12 deficiency     Vitamin D deficiency        Family History   Problem Relation Age of Onset    Heart Disease Mother     Hypertension Mother     Dementia Mother     Stroke Father     Diabetes Father     Diabetes Brother     Cancer Brother         leukermia    Diabetes Paternal Aunt     Diabetes Paternal Uncle     Cancer Maternal Grandmother         leukemia    Diabetes Paternal Grandfather        Social History     Tobacco Use    Smoking status: Never Smoker    Smokeless tobacco: Never Used   Substance Use Topics    Alcohol use: No    Drug use: No       ROS:  Per HPI    PE:  Visit Vitals  /63   Pulse 69   Temp 98.2 °F (36.8 °C)   Resp 16   Ht 5' 8.5\" (1.74 m)   Wt 155 lb (70.3 kg)   SpO2 97%   BMI 23.22 kg/m²     Gen: Pt sitting in chair, in NAD  Head: Normocephalic, atraumatic  Eyes: Sclera anicteric, EOM grossly intact, PERRL  Ears: TM's pearly with good light reflex b/l  Nose: Normal nasal mucosa  Throat: MMM, normal lips, tongue, teeth and gums  Neck: Supple, no LAD, no thyromegaly or carotid bruits  CVS: Normal S1, S2, no m/r/g  Resp: CTAB, no wheezes or rales  Extrem: Atraumatic, no cyanosis or edema. L foot with callous on medial aspect of great toe with 5mm fluid-filled blister just proximal to this on the dorsum of the toe. No surrounding erythema or drainage. Pulses: 2+   Skin: Warm, dry  Neuro: Alert, oriented, appropriate      A/P:   Encounter Diagnoses     ICD-10-CM ICD-9-CM   1. Medicare annual wellness visit, subsequent  Z00.00 V70.0   2. Vitamin D deficiency  E55.9 268.9   3. Vitamin B12 deficiency  E53.8 266.2   4. History of anemia  Z86.2 V12.3   5. Leukopenia, unspecified type  D72.819 288.50   6. Encounter for immunization  Z23 V03.89   7. Exposure to COVID-19 virus  Z20.822 V01.79   8. Vitamin B12 deficiency anemia due to intrinsic factor deficiency   D51.0 281.0   9. Age-related osteoporosis without current pathological fracture  M81.0 733.01   10. Impacted cerumen of left ear  H61.22 380.4     1. Medicare annual wellness visit, subsequent: See separate note from today  - LIPID PANEL; Future  - METABOLIC PANEL, BASIC; Future  - METABOLIC PANEL, BASIC  - LIPID PANEL    2. Vitamin D deficiency: Will recheck levels  - VITAMIN D, 25 HYDROXY; Future  - VITAMIN D, 25 HYDROXY    3. Vitamin B12 deficiency: Pt on supplementation, will check levels today  - VITAMIN B12; Future  - VITAMIN B12    4. History of anemia:  - IRON PROFILE;  Future  - IRON PROFILE    5. Leukopenia, unspecified type: Pt also requesting thyroid levels checked, has no known h/o thyroid disease.   - TSH 3RD GENERATION; Future  - T4, FREE; Future  - CBC W/O DIFF; Future  - CBC W/O DIFF  - T4, FREE  - TSH 3RD GENERATION    6. Encounter for immunization  - ADMIN INFLUENZA VIRUS VAC  - FLU (FLUAD QUAD INFLUENZA VACCINE,QUAD,ADJUVANTED)    7. Exposure to COVID-19 virus: Pt requesting to have antibody levels checked  - SARS-COV-2 AB, IGG    8. Vitamin B12 deficiency anemia due to intrinsic factor deficiency   - TSH 3RD GENERATION; Future  - T4, FREE; Future  - T4, FREE  - TSH 3RD GENERATION    9. Age-related osteoporosis without current pathological fracture: Discussed results of DEXA scan, recommendations for treatment, rationale, and treatment options. She is not interested in starting a bisphosphonate at this time but will let us know if she changes her mind. Handout given. 10. Impacted cerumen of left ear: Cerumen removed via lavage.   - REMOVAL IMPACTED CERUMEN IRRIGATION/LVG UNILAT       RTC in 1 year for MWV, or sooner prn    Discussed diagnoses in detail with patient. Medication risks/benefits/side effects discussed with patient. All of the patient's questions were addressed. The patient understands and agrees with our plan of care. The patient knows to call back if they are unsure of or forget any changes we discussed today or if the symptoms change. The patient received an After-Visit Summary which contains VS, orders, medication list and allergy list. This can be used as a \"mini-medical record\" should they have to seek medical care while out of town. Current Outpatient Medications on File Prior to Visit   Medication Sig Dispense Refill    OTHER Black elderberry gummie ,vitc,zinc 100mg      cholecalciferol (VITAMIN D3) (1000 Units /25 mcg) tablet Take  by mouth daily. Vit d 50mcg,vit k 200mcg      ascorbic acid, vitamin C, (Vitamin C) 500 mg tablet Take 2 Tablets by mouth.       OTHER borox 3 mg  Broken cell wall chlorella, horactail grass 440mg      Diclofenac Potassium (CAMBIA) 50 mg pwpk Take 1 Packet by mouth as needed. 1 Packet 0    Omega-3 Fatty Acids (FISH OIL) 500 mg cap Take  by mouth.  multivitamin (ONE A DAY) tablet Take 1 Tab by mouth daily.  digestive enzymes tab Take  by mouth.  Lactobac no.41/Bifidobact no.7 (PROBIOTIC-10 PO) Take  by mouth. Megaspore biotic 340 mg      ferrous sulfate 220 mg (44 mg iron)/5 mL solution Take  by mouth daily. (Patient not taking: Reported on 12/6/2021)      L. acidophilus/Strept/La p-talita (NATE-Q PO) Take  by mouth. (Patient not taking: Reported on 12/6/2021)       No current facility-administered medications on file prior to visit.

## 2021-12-08 LAB
SARS-COV-2 SEMI-QUANT IGG AB: <13 AU/ML
SARS-COV-2 SPIKE AB, INTERP - CVSQ1M: NEGATIVE

## 2022-03-18 PROBLEM — G43.109 MIGRAINE WITH AURA AND WITHOUT STATUS MIGRAINOSUS, NOT INTRACTABLE: Status: ACTIVE | Noted: 2018-12-21

## 2022-03-19 PROBLEM — D64.9 ANEMIA: Status: ACTIVE | Noted: 2018-07-08

## 2023-02-23 ENCOUNTER — PATIENT MESSAGE (OUTPATIENT)
Dept: FAMILY MEDICINE CLINIC | Age: 68
End: 2023-02-23

## 2023-02-23 ENCOUNTER — OFFICE VISIT (OUTPATIENT)
Dept: FAMILY MEDICINE CLINIC | Age: 68
End: 2023-02-23
Payer: MEDICARE

## 2023-02-23 VITALS
WEIGHT: 151 LBS | SYSTOLIC BLOOD PRESSURE: 147 MMHG | RESPIRATION RATE: 16 BRPM | DIASTOLIC BLOOD PRESSURE: 68 MMHG | TEMPERATURE: 97.9 F | BODY MASS INDEX: 22.36 KG/M2 | HEART RATE: 62 BPM | OXYGEN SATURATION: 100 % | HEIGHT: 69 IN

## 2023-02-23 DIAGNOSIS — R07.89 CHEST PRESSURE: ICD-10-CM

## 2023-02-23 DIAGNOSIS — M81.0 AGE-RELATED OSTEOPOROSIS WITHOUT CURRENT PATHOLOGICAL FRACTURE: ICD-10-CM

## 2023-02-23 DIAGNOSIS — Z23 ENCOUNTER FOR IMMUNIZATION: ICD-10-CM

## 2023-02-23 DIAGNOSIS — E55.9 VITAMIN D DEFICIENCY: ICD-10-CM

## 2023-02-23 DIAGNOSIS — I10 PRIMARY HYPERTENSION: ICD-10-CM

## 2023-02-23 DIAGNOSIS — E53.8 VITAMIN B12 DEFICIENCY: ICD-10-CM

## 2023-02-23 DIAGNOSIS — Z00.00 MEDICARE ANNUAL WELLNESS VISIT, SUBSEQUENT: Primary | ICD-10-CM

## 2023-02-23 DIAGNOSIS — Z12.31 ENCOUNTER FOR SCREENING MAMMOGRAM FOR MALIGNANT NEOPLASM OF BREAST: ICD-10-CM

## 2023-02-23 DIAGNOSIS — D72.819 LEUKOPENIA, UNSPECIFIED TYPE: ICD-10-CM

## 2023-02-23 DIAGNOSIS — Z86.2 HISTORY OF ANEMIA: ICD-10-CM

## 2023-02-23 PROCEDURE — 99214 OFFICE O/P EST MOD 30 MIN: CPT | Performed by: FAMILY MEDICINE

## 2023-02-23 PROCEDURE — 3078F DIAST BP <80 MM HG: CPT | Performed by: FAMILY MEDICINE

## 2023-02-23 PROCEDURE — G9899 SCRN MAM PERF RSLTS DOC: HCPCS | Performed by: FAMILY MEDICINE

## 2023-02-23 PROCEDURE — 1090F PRES/ABSN URINE INCON ASSESS: CPT | Performed by: FAMILY MEDICINE

## 2023-02-23 PROCEDURE — G0439 PPPS, SUBSEQ VISIT: HCPCS | Performed by: FAMILY MEDICINE

## 2023-02-23 PROCEDURE — G8420 CALC BMI NORM PARAMETERS: HCPCS | Performed by: FAMILY MEDICINE

## 2023-02-23 PROCEDURE — 1101F PT FALLS ASSESS-DOCD LE1/YR: CPT | Performed by: FAMILY MEDICINE

## 2023-02-23 PROCEDURE — 3017F COLORECTAL CA SCREEN DOC REV: CPT | Performed by: FAMILY MEDICINE

## 2023-02-23 PROCEDURE — G8427 DOCREV CUR MEDS BY ELIG CLIN: HCPCS | Performed by: FAMILY MEDICINE

## 2023-02-23 PROCEDURE — 3077F SYST BP >= 140 MM HG: CPT | Performed by: FAMILY MEDICINE

## 2023-02-23 PROCEDURE — G8536 NO DOC ELDER MAL SCRN: HCPCS | Performed by: FAMILY MEDICINE

## 2023-02-23 PROCEDURE — G8432 DEP SCR NOT DOC, RNG: HCPCS | Performed by: FAMILY MEDICINE

## 2023-02-23 PROCEDURE — 93000 ELECTROCARDIOGRAM COMPLETE: CPT | Performed by: FAMILY MEDICINE

## 2023-02-23 PROCEDURE — 1123F ACP DISCUSS/DSCN MKR DOCD: CPT | Performed by: FAMILY MEDICINE

## 2023-02-23 NOTE — PROGRESS NOTES
This is the Subsequent Medicare Annual Wellness Exam, performed 12 months or more after the Initial AWV or the last Subsequent AWV    I have reviewed the patient's medical history in detail and updated the computerized patient record. Assessment/Plan   Education and counseling provided:  Are appropriate based on today's review and evaluation  Pneumococcal Vaccine - declines  Influenza Vaccine - declines  Bone mass measurement (DEXA) - order placed. Pt had osteoporosis on last scan 2 years ago but declined medication at that time and has been using natural remedies to try and improve this. She would like to get it rechecked now. COVID vaccine - declines, TDaP - rx sent  Mammogram - order placed    1. Medicare annual wellness visit, subsequent     Depression Risk Factor Screening     3 most recent PHQ Screens 12/6/2021   Little interest or pleasure in doing things Not at all   Feeling down, depressed, irritable, or hopeless Not at all   Total Score PHQ 2 0       Alcohol & Drug Abuse Risk Screen    Do you average more than 1 drink per night or more than 7 drinks a week:  No    On any one occasion in the past three months have you have had more than 3 drinks containing alcohol:  No          Functional Ability and Level of Safety    Hearing: Hearing is good. Activities of Daily Living: The home contains: handrails  Patient does total self care      Ambulation: with no difficulty     Fall Risk:  Fall Risk Assessment, last 12 mths 2/23/2023   Able to walk? Yes   Fall in past 12 months? 0   Do you feel unsteady? 0   Are you worried about falling 0   Number of falls in past 12 months -   Fall with injury?  -      Abuse Screen:  Patient is not abused       Cognitive Screening    Has your family/caregiver stated any concerns about your memory: no       Health Maintenance Due     Health Maintenance Due   Topic Date Due    COVID-19 Vaccine (1) Never done    Pneumococcal 65+ years (2 - PPSV23 if available, else PCV20) 08/28/2019    Flu Vaccine (1) 08/01/2022    DTaP/Tdap/Td series (2 - Td or Tdap) 09/19/2022    Depression Screen  12/06/2022    Breast Cancer Screen Mammogram  12/18/2022       Patient Care Team   Patient Care Team:  Jeny Vanegas MD as PCP - General (Family Medicine)  Jeny Vanegas MD as PCP - [facility] Empaneled Provider  Javier Amador MD (Dermatology Physician)  Pia Espinoza MD (Rheumatology Internal Medicine)    History     Patient Active Problem List   Diagnosis Code    Chest discomfort R07.89    IBS (irritable bowel syndrome) K58.9    Anemia D64.9    Migraine with aura and without status migrainosus, not intractable G43.109     Past Medical History:   Diagnosis Date    Anemia NEC     IBS (irritable bowel syndrome)     Leukopenia     Osteoporosis     Vitamin B12 deficiency     Vitamin D deficiency       Past Surgical History:   Procedure Laterality Date    DILATION AND CURETTAGE      HX GYN  1979    D&C     Current Outpatient Medications   Medication Sig Dispense Refill    OTHER Black elderberry gummie ,vitc,zinc 100mg      cholecalciferol (VITAMIN D3) (1000 Units /25 mcg) tablet Take  by mouth daily. Vit d 50mcg,vit k 200mcg      ascorbic acid, vitamin C, (VITAMIN C) 500 mg tablet Take 2 Tablets by mouth. OTHER borox 3 mg  Broken cell wall chlorella, horactail grass 440mg      Omega-3 Fatty Acids 500 mg cap Take  by mouth.      multivitamin (ONE A DAY) tablet Take 1 Tab by mouth daily. Lactobac no.41/Bifidobact no.7 (PROBIOTIC-10 PO) Take  by mouth. Megaspore biotic 340 mg (Patient not taking: Reported on 2/23/2023)      Diclofenac Potassium (CAMBIA) 50 mg pwpk Take 1 Packet by mouth as needed. (Patient not taking: Reported on 2/23/2023) 1 Packet 0    digestive enzymes tab Take  by mouth.  (Patient not taking: Reported on 2/23/2023)       No Known Allergies    Family History   Problem Relation Age of Onset    Heart Disease Mother     Hypertension Mother     Dementia Mother     Stroke Father     Diabetes Father     Diabetes Brother     Cancer Brother         leukermia    Diabetes Paternal Aunt     Diabetes Paternal Uncle     Cancer Maternal Grandmother         leukemia    Diabetes Paternal Grandfather      Social History     Tobacco Use    Smoking status: Never    Smokeless tobacco: Never   Substance Use Topics    Alcohol use: No         Emery Hurley MD

## 2023-02-23 NOTE — PROGRESS NOTES
1. Have you been to the ER, urgent care clinic since your last visit? Hospitalized since your last visit? No    2. Have you seen or consulted any other health care providers outside of the [address] [place] [address] since your last visit? Include any pap smears or colon screening. No  Reviewed record in preparation for visit and have necessary documentation  Pt did not bring medication to office visit for review  opportunity was given for questions      3. For patients aged 39-70: Has the patient had a colonoscopy / FIT/ Cologuard? Yes - no Care Gap present      If the patient is female:    4. For patients aged 41-77: Has the patient had a mammogram within the past 2 years? No      5. For patients aged 21-65: Has the patient had a pap smear?  NA - based on age or sex      Goals that were addressed and/or need to be completed during or after this appointment include   Health Maintenance Due   Topic Date Due    COVID-19 Vaccine (1) Never done    Pneumococcal 65+ years (2 - PPSV23 if available, else PCV20) 08/28/2019    Flu Vaccine (1) 08/01/2022    DTaP/Tdap/Td series (2 - Td or Tdap) 09/19/2022    Depression Screen  12/06/2022    Medicare Yearly Exam  12/07/2022    Breast Cancer Screen Mammogram  12/18/2022

## 2023-02-23 NOTE — PATIENT INSTRUCTIONS
Medicare Wellness Visit, Female     The best way to live healthy is to have a lifestyle where you eat a well-balanced diet, exercise regularly, limit alcohol use, and quit all forms of tobacco/nicotine, if applicable. Regular preventive services are another way to keep healthy. Preventive services (vaccines, screening tests, monitoring & exams) can help personalize your care plan, which helps you manage your own care. Screening tests can find health problems at the earliest stages, when they are easiest to treat. [facility] follows the current, evidence-based guidelines published by the [facility] ([facility]) when recommending preventive services for our patients. Because we follow these guidelines, sometimes recommendations change over time as research supports it. (For example, mammograms used to be recommended annually. Even though Medicare will still pay for an annual mammogram, the newer guidelines recommend a mammogram every two years for women of average risk). Of course, you and your doctor may decide to screen more often for some diseases, based on your risk and your co-morbidities (chronic disease you are already diagnosed with). Preventive services for you include:  - Medicare offers their members a free annual wellness visit, which is time for you and your primary care provider to discuss and plan for your preventive service needs.  Take advantage of this benefit every year!    -Over the age of 72 should receive the recommended pneumonia vaccines.    -All adults should have a flu vaccine yearly.  -All adults should have a tetanus vaccine every 10 years.   -Over the age 48 should receive the shingles vaccines.        -All adults should be screened once for Hepatitis C.  -All adults age 38-68 who are overweight should have a diabetes screening test once every three years.   -Other screening tests and preventive services for persons with diabetes include: an eye exam to screen for diabetic retinopathy, a kidney function test, a foot exam, and stricter control over your cholesterol.   -Cardiovascular screening for adults with routine risk involves an electrocardiogram (ECG) at intervals determined by your doctor.     -Colorectal cancer screenings should be done for adults age 39-70 with no increased risk factors for colorectal cancer. There are a number of acceptable methods of screening for this type of cancer. Each test has its own benefits and drawbacks. Discuss with your doctor what is most appropriate for you during your annual wellness visit. The different tests include: colonoscopy (considered the best screening method), a fecal occult blood test, a fecal DNA test, and sigmoidoscopy.    -Lung cancer screening is recommended annually with a low dose CT scan for adults between age 54 and 68, who have smoked at least 30 pack years (equivalent of 1 pack per day for 30 days), and who is a current smoker or quit less than 15 years ago.    -A bone mass density test is recommended when a woman turns 65 to screen for osteoporosis. This test is only recommended one time, as a screening. Some providers will use this same test as a disease monitoring tool if you already have osteoporosis. -Breast cancer screenings are recommended every other year for women of normal risk, age 54-69.    -Cervical cancer screenings for women over age 72 are only recommended with certain risk factors.      Here is a list of your current Health Maintenance items (your personalized list of preventive services) with a due date:  Health Maintenance Due   Topic Date Due    COVID-19 Vaccine (1) Never done    Pneumococcal Vaccine (2 - PPSV23 if available, else PCV20) 08/28/2019    Yearly Flu Vaccine (1) 08/01/2022    DTaP/Tdap/Td  (2 - Td or Tdap) 09/19/2022    Depresssion Screening  12/06/2022    Mammogram  12/18/2022

## 2023-02-23 NOTE — PROGRESS NOTES
CC: Chest pressure     HPI: Pt is a 69 y.o. female who presents for chest pressure. Started one week ago and she has had several episodes. Not associated with exertion and pressure resolves on its own in minutes to hours. The sensation is not strong enough to make her stop what she is doing. Denies SOB with episodes but did have sweating once. Denies any symptoms of acid reflux. She has never had similar symptoms in the past. Her father  of an MI in his 52's and her mother  of CHF in her [de-identified]. Her daughter also sees a Cardiologist but she doesn't know why. Pt has never seen a Cardiologist or had cardiac work-up. She is having the symptoms this morning. Home BP's have ranged 122-156/58-71. She sometimes has blurry vision and HA but thinks it is mostly related to her sinuses. No leg swelling. She is requesting specific labs be checked today as part of her yearly check-up.       Past Medical History:   Diagnosis Date    Anemia NEC     IBS (irritable bowel syndrome)     Leukopenia     Osteoporosis     Vitamin B12 deficiency     Vitamin D deficiency        Family History   Problem Relation Age of Onset    Heart Disease Mother     Hypertension Mother     Dementia Mother     Stroke Father     Diabetes Father     Diabetes Brother     Cancer Brother         leukermia    Diabetes Paternal Aunt     Diabetes Paternal Uncle     Cancer Maternal Grandmother         leukemia    Diabetes Paternal Grandfather        Social History     Tobacco Use    Smoking status: Never    Smokeless tobacco: Never   Substance Use Topics    Alcohol use: No    Drug use: No       ROS:  Per HPI    PE:  Visit Vitals  BP (!) 147/68   Pulse 62   Temp 97.9 °F (36.6 °C)   Resp 16   Ht 5' 8.5\" (1.74 m)   Wt 151 lb (68.5 kg)   SpO2 100%   BMI 22.63 kg/m²     Gen: Pt sitting in chair, in NAD  Head: Normocephalic, atraumatic  Eyes: Sclera anicteric, EOM grossly intact, PERRL  Ears: TM's pearly with good light reflex b/l  Nose: Normal nasal mucosa  Throat: MMM, normal lips, tongue and gums  Neck: Supple, no LAD, no thyromegaly or carotid bruits  CVS: Normal S1, S2, no m/r/g  Resp: CTAB, no wheezes or rales  Abd: Soft, non-tender, non-distended, +BS  Extrem: Atraumatic, no cyanosis. Trace edema to ankles b/l  Pulses: 2+   Skin: Warm, dry  Neuro: Alert, oriented, appropriate      A/P:   Encounter Diagnoses     ICD-10-CM ICD-9-CM   1. Medicare annual wellness visit, subsequent  Z00.00 V70.0   2. Encounter for immunization  Z23 V03.89   3. Encounter for screening mammogram for malignant neoplasm of breast  Z12.31 V76.12   4. Age-related osteoporosis without current pathological fracture  M81.0 733.01   5. Chest pressure  R07.89 786.59   6. Vitamin D deficiency  E55.9 268.9   7. Vitamin B12 deficiency  E53.8 266.2   8. Leukopenia, unspecified type  D72.819 288.50   9. Primary hypertension  I10 401.9   10. History of anemia  Z86.2 V12.3   11. Bradycardia  R00.1 427.89     1. Medicare annual wellness visit, subsequent: See separate note from today. 2. Encounter for immunization  - diph,Pertuss,Acell,,Tet Vac-PF (ADACEL) 2 Lf-(2.5-5-3-5 mcg)-5Lf/0.5 mL susp; 0.5 mL by IntraMUSCular route once for 1 dose. Dispense: 1 Each; Refill: 0    3. Encounter for screening mammogram for malignant neoplasm of breast  - [facility] 3D REY W MAMMO BI SCREENING INCL CAD; Future    4. Age-related osteoporosis without current pathological fracture: DEXA from 2021 shows osteoporosis, however pt declined medical treatment at that time and opted to try supplements. She is interested in rechecking this now. - DEXA BONE DENSITY STUDY AXIAL; Future    5. Chest pressure: Discussed with pt. EKG reassuring, and sensation may be non-cardiac in nature, however given her risk factors, recommend she see Cardiology for further evaluation/consideration of stress test. She is amenable to this.  Strict ER precautions given in the meantime.   - AMB POC EKG ROUTINE W/ 12 LEADS, INTER & REP  - REFERRAL TO CARDIOLOGY    6. Vitamin D deficiency:   - VITAMIN D, 25 HYDROXY; Future  - VITAMIN D, 25 HYDROXY    7. Vitamin B12 deficiency:   - CBC W/O DIFF; Future  - IRON PROFILE; Future  - TSH 3RD GENERATION; Future  - T4, FREE; Future  - T3 TOTAL; Future  - T3 TOTAL  - T4, FREE  - TSH 3RD GENERATION  - IRON PROFILE  - CBC W/O DIFF    8. Leukopenia, unspecified type:   - TSH 3RD GENERATION; Future  - T4, FREE; Future  - T3 TOTAL; Future  - T3 TOTAL  - T4, FREE  - TSH 3RD GENERATION    9. Primary hypertension: BP elevated today and has been on home logs as well. Discussed with pt. She is not interested in medication at this time and wants to continue working on diet and lifestyle modifications to bring it down. Advised her to call if she changes her mind. Will get recheck at Cardiology visit as well. - CBC W/O DIFF; Future  - NMR LIPOPROFILE WITH LIPIDS (WITHOUT GRAPH); Future  - TSH 3RD GENERATION; Future  - T4, FREE; Future  - T3 TOTAL; Future  - T3 TOTAL  - T4, FREE  - TSH 3RD GENERATION  - NMR LIPOPROFILE WITH LIPIDS (WITHOUT GRAPH)  - CBC W/O DIFF    10. History of anemia  - IRON PROFILE; Future  - IRON PROFILE      RTC in 1 year for follow-up, or sooner prn    Discussed diagnoses in detail with patient. Medication risks/benefits/side effects discussed with patient. All of the patient's questions were addressed. The patient understands and agrees with our plan of care. The patient knows to call back if they are unsure of or forget any changes we discussed today or if the symptoms change. The patient received an After-Visit Summary which contains VS, orders, medication list and allergy list. This can be used as a \"mini-medical record\" should they have to seek medical care while out of town.     Current Outpatient Medications on File Prior to Visit   Medication Sig Dispense Refill    OTHER Black elderberry gummie ,vitc,zinc 100mg      cholecalciferol (VITAMIN D3) (1000 Units /25 mcg) tablet Take  by mouth daily. Vit d 50mcg,vit k 200mcg      ascorbic acid, vitamin C, (VITAMIN C) 500 mg tablet Take 2 Tablets by mouth. OTHER borox 3 mg  Broken cell wall chlorella, horactail grass 440mg      Omega-3 Fatty Acids 500 mg cap Take  by mouth.      multivitamin (ONE A DAY) tablet Take 1 Tab by mouth daily. No current facility-administered medications on file prior to visit.

## 2023-02-24 LAB
25(OH)D3 SERPL-MCNC: 56.2 NG/ML (ref 30–100)
IRON SATN MFR SERPL: 27 % (ref 20–50)
IRON SERPL-MCNC: 106 UG/DL (ref 35–150)
T4 FREE SERPL-MCNC: 1 NG/DL (ref 0.8–1.5)
TIBC SERPL-MCNC: 399 UG/DL (ref 250–450)
TSH SERPL DL<=0.05 MIU/L-ACNC: 2.09 UIU/ML (ref 0.36–3.74)

## 2023-02-25 LAB
CHOLEST SERPL-MCNC: 163 MG/DL (ref 100–199)
HDL SERPL-SCNC: 32 UMOL/L
HDLC SERPL-MCNC: 64 MG/DL
LDL SERPL QN: 20.7 NM
LDL SERPL-SCNC: 967 NMOL/L
LDL SMALL SERPL-SCNC: 226 NMOL/L
LDLC SERPL CALC-MCNC: 83 MG/DL (ref 0–99)
LP-IR SCORE SERPL: <25
T3 SERPL-MCNC: 137 NG/DL (ref 71–180)
TRIGL SERPL-MCNC: 83 MG/DL (ref 0–149)

## 2023-02-26 ENCOUNTER — APPOINTMENT (OUTPATIENT)
Dept: GENERAL RADIOLOGY | Age: 68
End: 2023-02-26
Attending: EMERGENCY MEDICINE
Payer: MEDICARE

## 2023-02-26 ENCOUNTER — HOSPITAL ENCOUNTER (EMERGENCY)
Age: 68
Discharge: HOME | End: 2023-02-26
Attending: EMERGENCY MEDICINE
Payer: MEDICARE

## 2023-02-26 VITALS
DIASTOLIC BLOOD PRESSURE: 57 MMHG | BODY MASS INDEX: 22.88 KG/M2 | TEMPERATURE: 97.9 F | OXYGEN SATURATION: 100 % | HEIGHT: 68 IN | RESPIRATION RATE: 16 BRPM | HEART RATE: 61 BPM | WEIGHT: 151 LBS | SYSTOLIC BLOOD PRESSURE: 165 MMHG

## 2023-02-26 DIAGNOSIS — R07.9 CHEST PAIN, UNSPECIFIED TYPE: Primary | ICD-10-CM

## 2023-02-26 LAB
ALBUMIN SERPL-MCNC: 4.1 G/DL (ref 3.5–5)
ALBUMIN/GLOB SERPL: 1.3 (ref 1.1–2.2)
ALP SERPL-CCNC: 69 U/L (ref 45–117)
ALT SERPL-CCNC: 22 U/L (ref 12–78)
ANION GAP SERPL CALC-SCNC: 5 MMOL/L (ref 5–15)
AST SERPL-CCNC: 21 U/L (ref 15–37)
ATRIAL RATE: 62 BPM
BASOPHILS # BLD: 0 K/UL (ref 0–0.1)
BASOPHILS NFR BLD: 0 % (ref 0–1)
BILIRUB SERPL-MCNC: 0.9 MG/DL (ref 0.2–1)
BUN SERPL-MCNC: 19 MG/DL (ref 6–20)
BUN/CREAT SERPL: 29 (ref 12–20)
CALCIUM SERPL-MCNC: 9.4 MG/DL (ref 8.5–10.1)
CALCULATED P AXIS, ECG09: 40 DEGREES
CALCULATED R AXIS, ECG10: -14 DEGREES
CALCULATED T AXIS, ECG11: 34 DEGREES
CHLORIDE SERPL-SCNC: 108 MMOL/L (ref 97–108)
CO2 SERPL-SCNC: 28 MMOL/L (ref 21–32)
COMMENT, HOLDF: NORMAL
CREAT SERPL-MCNC: 0.65 MG/DL (ref 0.55–1.02)
D DIMER PPP FEU-MCNC: 0.44 MG/L FEU (ref 0–0.65)
DIAGNOSIS, 93000: NORMAL
DIFFERENTIAL METHOD BLD: ABNORMAL
EOSINOPHIL # BLD: 0 K/UL (ref 0–0.4)
EOSINOPHIL NFR BLD: 1 % (ref 0–7)
ERYTHROCYTE [DISTWIDTH] IN BLOOD BY AUTOMATED COUNT: 12.2 % (ref 11.5–14.5)
GLOBULIN SER CALC-MCNC: 3.1 G/DL (ref 2–4)
GLUCOSE SERPL-MCNC: 103 MG/DL (ref 65–100)
HCT VFR BLD AUTO: 38.7 % (ref 35–47)
HGB BLD-MCNC: 12.9 G/DL (ref 11.5–16)
IMM GRANULOCYTES # BLD AUTO: 0 K/UL (ref 0–0.04)
IMM GRANULOCYTES NFR BLD AUTO: 0 % (ref 0–0.5)
LYMPHOCYTES # BLD: 1.1 K/UL (ref 0.8–3.5)
LYMPHOCYTES NFR BLD: 36 % (ref 12–49)
MCH RBC QN AUTO: 29.8 PG (ref 26–34)
MCHC RBC AUTO-ENTMCNC: 33.3 G/DL (ref 30–36.5)
MCV RBC AUTO: 89.4 FL (ref 80–99)
MONOCYTES # BLD: 0.3 K/UL (ref 0–1)
MONOCYTES NFR BLD: 11 % (ref 5–13)
NEUTS SEG # BLD: 1.5 K/UL (ref 1.8–8)
NEUTS SEG NFR BLD: 52 % (ref 32–75)
NRBC # BLD: 0 K/UL (ref 0–0.01)
NRBC BLD-RTO: 0 PER 100 WBC
P-R INTERVAL, ECG05: 182 MS
PLATELET # BLD AUTO: 235 K/UL (ref 150–400)
PMV BLD AUTO: 8.7 FL (ref 8.9–12.9)
POTASSIUM SERPL-SCNC: 3.4 MMOL/L (ref 3.5–5.1)
PROT SERPL-MCNC: 7.2 G/DL (ref 6.4–8.2)
Q-T INTERVAL, ECG07: 418 MS
QRS DURATION, ECG06: 86 MS
QTC CALCULATION (BEZET), ECG08: 424 MS
RBC # BLD AUTO: 4.33 M/UL (ref 3.8–5.2)
SAMPLES BEING HELD,HOLD: NORMAL
SODIUM SERPL-SCNC: 141 MMOL/L (ref 136–145)
TROPONIN I SERPL HS-MCNC: 6 NG/L (ref 0–51)
VENTRICULAR RATE, ECG03: 62 BPM
WBC # BLD AUTO: 2.9 K/UL (ref 3.6–11)

## 2023-02-26 PROCEDURE — 85025 COMPLETE CBC W/AUTO DIFF WBC: CPT

## 2023-02-26 PROCEDURE — 80053 COMPREHEN METABOLIC PANEL: CPT

## 2023-02-26 PROCEDURE — 71045 X-RAY EXAM CHEST 1 VIEW: CPT

## 2023-02-26 PROCEDURE — 85379 FIBRIN DEGRADATION QUANT: CPT

## 2023-02-26 PROCEDURE — 93005 ELECTROCARDIOGRAM TRACING: CPT

## 2023-02-26 PROCEDURE — 36415 COLL VENOUS BLD VENIPUNCTURE: CPT

## 2023-02-26 PROCEDURE — 84484 ASSAY OF TROPONIN QUANT: CPT

## 2023-02-26 PROCEDURE — 99285 EMERGENCY DEPT VISIT HI MDM: CPT

## 2023-02-26 NOTE — ED TRIAGE NOTES
Patient reports she started with right sides chest pain under her breast at approximately 0500- Patient reports the pain is intermittent and sharp. Patient denies significant medical history.

## 2023-02-26 NOTE — DISCHARGE INSTRUCTIONS
You were seen in the emergency department for chest pain. The results of your tests were reassuring. Although an exact cause of your symptoms was not identified, the most likely cause is musculoskeletal pain. Please follow-up with a cardiologist and your PCP or return to the emergency department if you experience a worsening of symptoms or any new symptoms that are concerning to you.

## 2023-02-26 NOTE — ED NOTES
Pt given discharge instructions per provider and verbalized understanding, pt ambulatory from ED to home with daughter as  of vehicle.

## 2023-02-26 NOTE — ED PROVIDER NOTES
56-year-old female with PMHx of anemia, IBS, osteoporosis presents to the emergency department complaining of intermittent, sharp, nonpleuritic, nonexertional right-sided chest pain under her right breast since 5 AM this morning. Patient has no additional complaints at this time. The history is provided by the patient. Chest Pain (Angina)        Past Medical History:   Diagnosis Date    Anemia NEC     IBS (irritable bowel syndrome)     Leukopenia     Osteoporosis     Vitamin B12 deficiency     Vitamin D deficiency        Past Surgical History:   Procedure Laterality Date    DILATION AND CURETTAGE      HX GYN  1979    D&C         Family History:   Problem Relation Age of Onset    Heart Disease Mother     Hypertension Mother     Dementia Mother     Stroke Father     Diabetes Father     Diabetes Brother     Cancer Brother         leukermia    Diabetes Paternal Aunt     Diabetes Paternal Uncle     Cancer Maternal Grandmother         leukemia    Diabetes Paternal Grandfather        Social History     Socioeconomic History    Marital status:      Spouse name: Not on file    Number of children: Not on file    Years of education: Not on file    Highest education level: Not on file   Occupational History    Not on file   Tobacco Use    Smoking status: Never    Smokeless tobacco: Never   Substance and Sexual Activity    Alcohol use: No    Drug use: No    Sexual activity: Not on file   Other Topics Concern    Not on file   Social History Narrative    Not on file     Social Determinants of Health     Financial Resource Strain: Low Risk     Difficulty of Paying Living Expenses: Not hard at all   Food Insecurity: No Food Insecurity    Worried About Running Out of Food in the Last Year: Never true    [address] in the Last Year: Never true   Transportation Needs: No Transportation Needs    Lack of Transportation (Medical): No    Lack of Transportation (Non-Medical):  No   Physical Activity: Not on file   Stress: Not on file   Social Connections: Not on file   Intimate Partner Violence: Not on file   Housing Stability: Low Risk     Unable to Pay for Housing in the Last Year: No    Number of Places Lived in the Last Year: 1    Unstable Housing in the Last Year: No         ALLERGIES: Patient has no known allergies. Review of Systems   Constitutional: Negative. HENT: Negative. Eyes: Negative. Respiratory: Negative. Cardiovascular:  Positive for chest pain. Gastrointestinal: Negative. Endocrine: Negative. Genitourinary: Negative. Musculoskeletal: Negative. Skin: Negative. Neurological: Negative. Hematological: Negative. Psychiatric/Behavioral: Negative. Vitals:    02/26/23 0829 02/26/23 0833   BP: (!) 188/69    Pulse: 62 62   Resp: 16    Temp: 97.9 °F (36.6 °C)    SpO2: 99%    Weight: 68.5 kg (151 lb)    Height: 5' 8\" (1.727 m)             Physical Exam  Vitals and nursing note reviewed. Constitutional:       General: She is not in acute distress. Appearance: Normal appearance. She is not ill-appearing. HENT:      Head: Normocephalic and atraumatic. Nose: Nose normal.      Mouth/Throat:      Mouth: Mucous membranes are moist.   Eyes:      Extraocular Movements: Extraocular movements intact. Pupils: Pupils are equal, round, and reactive to light. Cardiovascular:      Rate and Rhythm: Normal rate and regular rhythm. Pulses: Normal pulses. Pulmonary:      Effort: Pulmonary effort is normal. No respiratory distress. Breath sounds: Normal breath sounds. Abdominal:      General: There is no distension. Palpations: Abdomen is soft. Tenderness: There is no abdominal tenderness. Musculoskeletal:         General: Normal range of motion. Cervical back: Normal range of motion and neck supple. Skin:     General: Skin is warm and dry. Neurological:      General: No focal deficit present.       Mental Status: She is alert and oriented to person, place, and time. Psychiatric:         Mood and Affect: Mood normal.        Medical Decision Making  DDx: ACS, pericarditis, myocarditis, PE, musculoskeletal pain, anxiety    Plan:  - Labs: BMP, CBC, troponin, D-dimer  - Imaging: CXR    Reassessment: Patient's work-up is reassuring, including a normal troponin and normal D-dimer. Etiology of her chest pain is most likely musculoskeletal.  She may safely be discharged at this time with recommendation to follow-up with her PCP and cardiologist or to return to the emergency department if she experiences dizziness, dyspnea, or worsening chest pain, or any other symptoms that are concerning to her. Amount and/or Complexity of Data Reviewed  Labs: ordered. Radiology: ordered. ECG/medicine tests: ordered. ED Course as of 02/26/23 1321   Sun Feb 26, 2023   9800 This EKG was interpreted by me at 849. Normal sinus rhythm at 62 bpm.  Normal axis. No significant ST segment abnormalities.  [JT]      ED Course User Index  [JT] Jaspreet Clemons MD       Procedures

## 2023-03-01 ENCOUNTER — PATIENT OUTREACH (OUTPATIENT)
Dept: CASE MANAGEMENT | Age: 68
End: 2023-03-01

## 2023-03-01 NOTE — PROGRESS NOTES
Ambulatory Care Management Note    Date/Time:  3/1/2023 10:38 AM    Patient Current Location: [place]     This patient was received as a referral from [facility]. Ambulatory Care Manager outreached to patient today to offer care management services. Introduction to self and role of care manager provided. Patient declined care management services at this time. No follow up call scheduled at this time. Patient has Ambulatory Care Manager's contact number for any questions or concerns.

## 2023-06-29 ENCOUNTER — TELEPHONE (OUTPATIENT)
Facility: CLINIC | Age: 68
End: 2023-06-29

## 2024-03-06 SDOH — HEALTH STABILITY: PHYSICAL HEALTH: ON AVERAGE, HOW MANY DAYS PER WEEK DO YOU ENGAGE IN MODERATE TO STRENUOUS EXERCISE (LIKE A BRISK WALK)?: 4 DAYS

## 2024-03-06 SDOH — HEALTH STABILITY: PHYSICAL HEALTH: ON AVERAGE, HOW MANY MINUTES DO YOU ENGAGE IN EXERCISE AT THIS LEVEL?: 30 MIN

## 2024-03-06 ASSESSMENT — LIFESTYLE VARIABLES
HOW OFTEN DO YOU HAVE A DRINK CONTAINING ALCOHOL: 1
HOW OFTEN DO YOU HAVE SIX OR MORE DRINKS ON ONE OCCASION: 1
HOW OFTEN DO YOU HAVE A DRINK CONTAINING ALCOHOL: NEVER
HOW MANY STANDARD DRINKS CONTAINING ALCOHOL DO YOU HAVE ON A TYPICAL DAY: 0
HOW MANY STANDARD DRINKS CONTAINING ALCOHOL DO YOU HAVE ON A TYPICAL DAY: PATIENT DOES NOT DRINK

## 2024-03-06 ASSESSMENT — PATIENT HEALTH QUESTIONNAIRE - PHQ9
SUM OF ALL RESPONSES TO PHQ QUESTIONS 1-9: 0
SUM OF ALL RESPONSES TO PHQ9 QUESTIONS 1 & 2: 0
2. FEELING DOWN, DEPRESSED OR HOPELESS: 0
1. LITTLE INTEREST OR PLEASURE IN DOING THINGS: 0

## 2024-03-08 ENCOUNTER — OFFICE VISIT (OUTPATIENT)
Facility: CLINIC | Age: 69
End: 2024-03-08

## 2024-03-08 VITALS
RESPIRATION RATE: 18 BRPM | HEIGHT: 68 IN | HEART RATE: 53 BPM | DIASTOLIC BLOOD PRESSURE: 62 MMHG | WEIGHT: 149.6 LBS | BODY MASS INDEX: 22.67 KG/M2 | SYSTOLIC BLOOD PRESSURE: 140 MMHG | TEMPERATURE: 97.3 F | OXYGEN SATURATION: 100 %

## 2024-03-08 DIAGNOSIS — Z00.00 WELL WOMAN EXAM (NO GYNECOLOGICAL EXAM): ICD-10-CM

## 2024-03-08 DIAGNOSIS — Z23 IMMUNIZATION DUE: ICD-10-CM

## 2024-03-08 DIAGNOSIS — Z13.220 LIPID SCREENING: ICD-10-CM

## 2024-03-08 DIAGNOSIS — Z83.3 FAMILY HISTORY OF DIABETES MELLITUS: ICD-10-CM

## 2024-03-08 DIAGNOSIS — Z00.00 MEDICARE ANNUAL WELLNESS VISIT, SUBSEQUENT: Primary | ICD-10-CM

## 2024-03-08 DIAGNOSIS — R73.9 ELEVATED BLOOD SUGAR: ICD-10-CM

## 2024-03-08 DIAGNOSIS — E53.8 B12 DEFICIENCY: ICD-10-CM

## 2024-03-08 DIAGNOSIS — E55.9 VITAMIN D DEFICIENCY: ICD-10-CM

## 2024-03-08 SDOH — ECONOMIC STABILITY: FOOD INSECURITY: WITHIN THE PAST 12 MONTHS, THE FOOD YOU BOUGHT JUST DIDN'T LAST AND YOU DIDN'T HAVE MONEY TO GET MORE.: NEVER TRUE

## 2024-03-08 SDOH — ECONOMIC STABILITY: FOOD INSECURITY: WITHIN THE PAST 12 MONTHS, YOU WORRIED THAT YOUR FOOD WOULD RUN OUT BEFORE YOU GOT MONEY TO BUY MORE.: NEVER TRUE

## 2024-03-08 SDOH — ECONOMIC STABILITY: INCOME INSECURITY: HOW HARD IS IT FOR YOU TO PAY FOR THE VERY BASICS LIKE FOOD, HOUSING, MEDICAL CARE, AND HEATING?: NOT HARD AT ALL

## 2024-03-08 SDOH — ECONOMIC STABILITY: HOUSING INSECURITY
IN THE LAST 12 MONTHS, WAS THERE A TIME WHEN YOU DID NOT HAVE A STEADY PLACE TO SLEEP OR SLEPT IN A SHELTER (INCLUDING NOW)?: NO

## 2024-03-08 ASSESSMENT — PATIENT HEALTH QUESTIONNAIRE - PHQ9
SUM OF ALL RESPONSES TO PHQ QUESTIONS 1-9: 0
1. LITTLE INTEREST OR PLEASURE IN DOING THINGS: 0
SUM OF ALL RESPONSES TO PHQ QUESTIONS 1-9: 0
SUM OF ALL RESPONSES TO PHQ9 QUESTIONS 1 & 2: 0
SUM OF ALL RESPONSES TO PHQ QUESTIONS 1-9: 0
2. FEELING DOWN, DEPRESSED OR HOPELESS: 0
SUM OF ALL RESPONSES TO PHQ QUESTIONS 1-9: 0

## 2024-03-08 ASSESSMENT — ENCOUNTER SYMPTOMS
ABDOMINAL PAIN: 0
CHEST TIGHTNESS: 0

## 2024-03-08 NOTE — PROGRESS NOTES
[facility]    History of Present Illness:   Danita Guillen is a 68 y.o. female with history of IBS, Migraine, Anemia  CC: Medicare wellness  History provided by patient and Records    HPI:  Well Woman exam:  Danita Guillen is a 68 y.o. female presenting for well woman exam.     How would you rate your health in generally over the last year? Good  Has your physical and emotional health limited your social activities with family or friends? No    Current Complaints? None today. (See attached Problem visit note if applicable)    IBS: Under control with diet    Anemia History: Not a recent issue    Migraines: Not an issue for some time, only had a few bouts before, and uses OTC with good control     Blood pressure: At home BP inbetwen 130-140's typically, and 60-70's.    Menstrual/Sexual History:  Age at which menses began: 12-13 y.o.  Last menstrual period was early 50's  No vaginal bleeding    PAP History: Normal      Sexually active: No  Number of sexual partners: None currently  Type of sexual partners: None currently  Method of family planning: Menopause    Risk factors for breast cancer: Fibrocystic Breast disorder    Diet/Exercise History:  Diet: Favorite food Peanut butter.    - Does patient eat at least 5 servings of Fruits/vegetables daily? Yes   - Is patient currently dieting? No    Exercise: Patient does have structured exercise  - Does patient get 150 minutes of structured exercise weekly? 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 None    Healthcare maintenance:   Health Maintenance Due   Topic Date Due    DTaP/Tdap/Td vaccine (2 - Td or Tdap) 2022    Annual Wellness Visit (Medicare)  2024     Mammogram indicated? No  Colonoscopy indicated?  No  DEXA scan indicated?  No  HIV/STI testing indicated?  No  Hepatitis C testing indicated?  No  Lung cancer screening indicated?  No  AAA screening indicated?  
Allergies  Prior to Visit Medications    Medication Sig Taking? Authorizing Provider   Omega-3 Fatty Acids (OMEGA-3 FISH OIL PO) Take by mouth Yes Cinthia Linder MD   NONFORMULARY Beet root complex Yes Cinthia Linder MD   NONFORMULARY 2 times daily Broken cell wall chlorellae Yes Cinthia Linder MD   BORON PO Take by mouth Yes Cinthia Linder MD   NONFORMULARY Vit d ,vit k1 ,vit k2 Yes Cinthia Linder MD   Cyanocobalamin (B-12 PO) Take by mouth Yes Cinthia Linder MD   Multiple Vitamin (MULTI VITAMIN DAILY PO) Take by mouth Yes Cinthia Linder MD   NONFORMULARY Alexi sporebiotic Yes Cinthia Linder MD   Tetanus-Diphth-Acell Pertussis (BOOSTRIX) 5-2.5-18.5 LF-MCG/0.5 injection Inject 0.5 mLs into the muscle once for 1 dose Yes Sarwat Nash MD   ascorbic acid (VITAMIN C) 500 MG tablet Take by mouth Yes Automatic Reconciliation, Ar   vitamin D (CHOLECALCIFEROL) 25 MCG (1000 UT) TABS tablet Take by mouth daily Yes Automatic Reconciliation, Ar       CareTeam (Including outside providers/suppliers regularly involved in providing care):   Patient Care Team:  Sarwat Nash MD as PCP - General (Family Medicine)  Sarwat Nash MD as PCP - Empaneled Provider     Reviewed and updated this visit:  Tobacco  Allergies  Meds  Problems  Med Hx  Surg Hx  Soc Hx  Fam Hx

## 2024-03-09 LAB
25(OH)D3 SERPL-MCNC: 41.6 NG/ML (ref 30–100)
ALBUMIN SERPL-MCNC: 4.1 G/DL (ref 3.5–5)
ALBUMIN/GLOB SERPL: 1.4 (ref 1.1–2.2)
ALP SERPL-CCNC: 69 U/L (ref 45–117)
ALT SERPL-CCNC: 23 U/L (ref 12–78)
ANION GAP SERPL CALC-SCNC: 3 MMOL/L (ref 5–15)
AST SERPL-CCNC: 21 U/L (ref 15–37)
BILIRUB SERPL-MCNC: 0.6 MG/DL (ref 0.2–1)
BUN SERPL-MCNC: 9 MG/DL (ref 6–20)
BUN/CREAT SERPL: 14 (ref 12–20)
CALCIUM SERPL-MCNC: 9.6 MG/DL (ref 8.5–10.1)
CHLORIDE SERPL-SCNC: 106 MMOL/L (ref 97–108)
CHOLEST SERPL-MCNC: 176 MG/DL
CO2 SERPL-SCNC: 30 MMOL/L (ref 21–32)
CREAT SERPL-MCNC: 0.66 MG/DL (ref 0.55–1.02)
ERYTHROCYTE [DISTWIDTH] IN BLOOD BY AUTOMATED COUNT: 12.6 % (ref 11.5–14.5)
EST. AVERAGE GLUCOSE BLD GHB EST-MCNC: 108 MG/DL
GLOBULIN SER CALC-MCNC: 2.9 G/DL (ref 2–4)
GLUCOSE SERPL-MCNC: 97 MG/DL (ref 65–100)
HBA1C MFR BLD: 5.4 % (ref 4–5.6)
HCT VFR BLD AUTO: 39.8 % (ref 35–47)
HDLC SERPL-MCNC: 60 MG/DL
HDLC SERPL: 2.9 (ref 0–5)
HGB BLD-MCNC: 13.1 G/DL (ref 11.5–16)
LDLC SERPL CALC-MCNC: 97.8 MG/DL (ref 0–100)
MCH RBC QN AUTO: 30.3 PG (ref 26–34)
MCHC RBC AUTO-ENTMCNC: 32.9 G/DL (ref 30–36.5)
MCV RBC AUTO: 91.9 FL (ref 80–99)
NRBC # BLD: 0 K/UL (ref 0–0.01)
NRBC BLD-RTO: 0 PER 100 WBC
PLATELET # BLD AUTO: 248 K/UL (ref 150–400)
PMV BLD AUTO: 9.3 FL (ref 8.9–12.9)
POTASSIUM SERPL-SCNC: 4 MMOL/L (ref 3.5–5.1)
PROT SERPL-MCNC: 7 G/DL (ref 6.4–8.2)
RBC # BLD AUTO: 4.33 M/UL (ref 3.8–5.2)
SODIUM SERPL-SCNC: 139 MMOL/L (ref 136–145)
TRIGL SERPL-MCNC: 91 MG/DL
VIT B12 SERPL-MCNC: 1661 PG/ML (ref 193–986)
VLDLC SERPL CALC-MCNC: 18.2 MG/DL
WBC # BLD AUTO: 2.8 K/UL (ref 3.6–11)

## 2024-04-05 ENCOUNTER — OFFICE VISIT (OUTPATIENT)
Facility: CLINIC | Age: 69
End: 2024-04-05

## 2024-04-05 VITALS
OXYGEN SATURATION: 98 % | HEIGHT: 68 IN | SYSTOLIC BLOOD PRESSURE: 134 MMHG | RESPIRATION RATE: 19 BRPM | TEMPERATURE: 98.5 F | WEIGHT: 151 LBS | DIASTOLIC BLOOD PRESSURE: 74 MMHG | BODY MASS INDEX: 22.88 KG/M2 | HEART RATE: 90 BPM

## 2024-04-05 DIAGNOSIS — B96.89 ACUTE BACTERIAL SINUSITIS: Primary | ICD-10-CM

## 2024-04-05 DIAGNOSIS — J01.90 ACUTE BACTERIAL SINUSITIS: Primary | ICD-10-CM

## 2024-04-05 RX ORDER — AMOXICILLIN AND CLAVULANATE POTASSIUM 875; 125 MG/1; MG/1
1 TABLET, FILM COATED ORAL 2 TIMES DAILY
Qty: 14 TABLET | Refills: 0 | Status: SHIPPED | OUTPATIENT
Start: 2024-04-05 | End: 2024-04-12

## 2024-04-05 RX ORDER — PREDNISONE 10 MG/1
10 TABLET ORAL DAILY
Qty: 5 TABLET | Refills: 0 | Status: SHIPPED | OUTPATIENT
Start: 2024-04-05 | End: 2024-04-10

## 2024-04-05 ASSESSMENT — ENCOUNTER SYMPTOMS
CHEST TIGHTNESS: 0
SINUS PAIN: 0

## 2024-04-05 NOTE — PROGRESS NOTES
Chief Complaint   Patient presents with    Cough     Sneezing, sinus congestion, runny nose, headache, chills, temp 101 this morning. Reports 6 days duration.       \"Have you been to the ER, urgent care clinic since your last visit?  Hospitalized since your last visit?\"    NO    “Have you seen or consulted any other health care providers outside of [facility] since your last visit?”    NO               Health Maintenance Due   Topic Date Due    DTaP/Tdap/Td vaccine (2 - Td or Tdap) 09/19/2022

## 2024-04-05 NOTE — PROGRESS NOTES
[facility]    History of Present Illness:   Danita Guillen is a 68 y.o. female with history of IBS, Migraine, Anemia   CC: Viral URI  History provided by patient and Records    HPI:  Upper respiratory illness:  Presents with complaints of congestion, sore throat, dry cough, headache, chills, pain while swallowing, and hoarseness for 1 weeks.  Reports no nausea and no vomiting.  Denies night sweats and myalgias.  Symptoms are moderate.  Over-the-counter remedies including Emergen-C, tea, Tylenol   has been used with poor relief of symptoms.  Drinking plenty of fluids: yes  History of Asthma/COPD:  no  Smoking Status: non-smoker  Sick Contacts: contacts w/ similar symptoms [facility] [employer]       Health Maintenance  Health Maintenance Due   Topic Date Due    DTaP/Tdap/Td vaccine (2 - Td or Tdap) 09/19/2022       Past Medical, Family, and Social History:     Current Outpatient Medications on File Prior to Visit   Medication Sig Dispense Refill    Omega-3 Fatty Acids (OMEGA-3 FISH OIL PO) Take by mouth      NONFORMULARY Beet root complex      NONFORMULARY 2 times daily Broken cell wall chlorellae      BORON PO Take by mouth      NONFORMULARY Vit d ,vit k1 ,vit k2      Cyanocobalamin (B-12 PO) Take by mouth      Multiple Vitamin (MULTI VITAMIN DAILY PO) Take by mouth      NONFORMULARY Alexi sporebiotic      ascorbic acid (VITAMIN C) 500 MG tablet Take by mouth      vitamin D (CHOLECALCIFEROL) 25 MCG (1000 UT) TABS tablet Take by mouth daily       No current facility-administered medications on file prior to visit.       Patient Active Problem List   Diagnosis    Migraine with aura and without status migrainosus, not intractable    Anemia    IBS (irritable bowel syndrome)       Social History     Socioeconomic History    Marital status:      Spouse name: None    Number of children: None    Years of education: None    Highest education level: None   Tobacco Use    Smoking status: Never    Smokeless

## 2025-04-08 ENCOUNTER — OFFICE VISIT (OUTPATIENT)
Facility: CLINIC | Age: 70
End: 2025-04-08

## 2025-04-08 VITALS
SYSTOLIC BLOOD PRESSURE: 132 MMHG | TEMPERATURE: 97.9 F | BODY MASS INDEX: 22.73 KG/M2 | RESPIRATION RATE: 16 BRPM | WEIGHT: 150 LBS | OXYGEN SATURATION: 100 % | HEIGHT: 68 IN | HEART RATE: 65 BPM | DIASTOLIC BLOOD PRESSURE: 68 MMHG

## 2025-04-08 DIAGNOSIS — E55.9 VITAMIN D DEFICIENCY: ICD-10-CM

## 2025-04-08 DIAGNOSIS — K58.2 IRRITABLE BOWEL SYNDROME WITH BOTH CONSTIPATION AND DIARRHEA: ICD-10-CM

## 2025-04-08 DIAGNOSIS — G43.109 MIGRAINE WITH AURA AND WITHOUT STATUS MIGRAINOSUS, NOT INTRACTABLE: ICD-10-CM

## 2025-04-08 DIAGNOSIS — D64.9 ANEMIA, UNSPECIFIED TYPE: ICD-10-CM

## 2025-04-08 DIAGNOSIS — Z00.00 MEDICARE ANNUAL WELLNESS VISIT, SUBSEQUENT: Primary | ICD-10-CM

## 2025-04-08 DIAGNOSIS — E53.8 B12 DEFICIENCY: ICD-10-CM

## 2025-04-08 DIAGNOSIS — Z13.220 LIPID SCREENING: ICD-10-CM

## 2025-04-08 DIAGNOSIS — Z12.11 COLON CANCER SCREENING: ICD-10-CM

## 2025-04-08 SDOH — ECONOMIC STABILITY: FOOD INSECURITY: WITHIN THE PAST 12 MONTHS, YOU WORRIED THAT YOUR FOOD WOULD RUN OUT BEFORE YOU GOT MONEY TO BUY MORE.: NEVER TRUE

## 2025-04-08 SDOH — ECONOMIC STABILITY: FOOD INSECURITY: WITHIN THE PAST 12 MONTHS, THE FOOD YOU BOUGHT JUST DIDN'T LAST AND YOU DIDN'T HAVE MONEY TO GET MORE.: NEVER TRUE

## 2025-04-08 ASSESSMENT — LIFESTYLE VARIABLES
HOW OFTEN DO YOU HAVE A DRINK CONTAINING ALCOHOL: NEVER
HOW MANY STANDARD DRINKS CONTAINING ALCOHOL DO YOU HAVE ON A TYPICAL DAY: PATIENT DOES NOT DRINK

## 2025-04-08 ASSESSMENT — PATIENT HEALTH QUESTIONNAIRE - PHQ9
SUM OF ALL RESPONSES TO PHQ QUESTIONS 1-9: 0
1. LITTLE INTEREST OR PLEASURE IN DOING THINGS: NOT AT ALL
2. FEELING DOWN, DEPRESSED OR HOPELESS: NOT AT ALL

## 2025-04-08 ASSESSMENT — ENCOUNTER SYMPTOMS
CHEST TIGHTNESS: 0
BACK PAIN: 0
APNEA: 0

## 2025-04-08 NOTE — PROGRESS NOTES
\"Have you been to the ER, urgent care clinic since your last visit?  Hospitalized since your last visit?\"    no    “Have you seen or consulted any other health care providers outside our system since your last visit?”    no    Have you had a mammogram?”   NO    Date of last Mammogram: 4/6/2023       “Have you had a colorectal cancer screening such as a colonoscopy/FIT/Cologuard?    NO    Date of last Colonoscopy: 9/23/2014  No cologuard on file  No FIT/FOBT on file   No flexible sigmoidoscopy on file             Goals that were addressed and/or need to be completed during or after this appointment include   Health Maintenance Due   Topic Date Due    Pneumococcal 50+ years Vaccine (2 of 2 - PPSV23) 08/28/2019    DTaP/Tdap/Td vaccine (2 - Td or Tdap) 09/19/2022    COVID-19 Vaccine (1 - 2024-25 season) Never done    Colorectal Cancer Screen  09/23/2024    Depression Screen  03/08/2025    Annual Wellness Visit (Medicare)  03/09/2025    Breast cancer screen  04/06/2025      
Medicare Annual Wellness Visit    Danita Guillen is here for Medicare AWV    Assessment & Plan   Medicare annual wellness visit, subsequent  Irritable bowel syndrome with both constipation and diarrhea  -     Comprehensive Metabolic Panel; Future  -     CBC; Future  Migraine with aura and without status migrainosus, not intractable  Anemia, unspecified type  -     Iron and TIBC; Future  -     Ferritin; Future  Vitamin D deficiency  -     Vitamin D 25 Hydroxy; Future  B12 deficiency  -     Vitamin B12; Future  Lipid screening  -     Lipid Panel; Future  Colon cancer screening  -     [facility] - Salbador Taylor MD, Gastroenterology, [place]       Return in about 1 year (around 4/8/2026).     Subjective     Patient's complete Health Risk Assessment and screening values have been reviewed and are found in Flowsheets. The following problems were reviewed today and where indicated follow up appointments were made and/or referrals ordered.    Positive Risk Factor Screenings with Interventions:                    Safety:  Do you have non-slip mats or non-slip surfaces or shower bars or grab bars in your shower or bathtub?: (!) No  Interventions:  Follow up     Advanced Directives:  Do you have a Living Will?: (!) No    Intervention:  has NO advanced directive - information provided        Objective   Vitals:    04/08/25 0812   BP: 132/68   Pulse: 65   Resp: 16   Temp: 97.9 °F (36.6 °C)   SpO2: 100%   Weight: 68 kg (150 lb)   Height: 1.727 m (5' 8\")      Body mass index is 22.81 kg/m².                No Known Allergies  Prior to Visit Medications    Medication Sig Taking? Authorizing Provider   Omega-3 Fatty Acids (OMEGA-3 FISH OIL PO) Take by mouth Yes Cinthia Linder MD   NONFORMULARY Beet root complex Yes Cinthia Linder MD   NONFORMULARY 2 times daily Broken cell wall chlorellae Yes Cinthia Linder MD   BORON PO Take by mouth Yes Cinthia Linder MD   NONFORMULARY Vit d ,vit k1 ,vit k2 Yes Kailash 
B12 deficiency  - Vitamin B12; Future    6. Lipid screening  - Lipid Panel; Future    7. Colon cancer screening  - JEANNE - Salbador Taylor MD, Gastroenterology, [place]       Follow-up and Dispositions    Return in about 1 year (around 4/8/2026).           I have discussed the diagnosis with the patient and the intended plan as seen in the above orders.  Social history, medical history, and labs were reviewed.  The patient has received an after-visit summary and questions were answered concerning future plans.  I have discussed medication side effects and warnings with the patient as well.    Sarwat Nash MD  [facility]  04/08/25

## 2025-04-09 LAB
25(OH)D3 SERPL-MCNC: 42.5 NG/ML (ref 30–100)
ALBUMIN SERPL-MCNC: 4.2 G/DL (ref 3.5–5)
ALBUMIN/GLOB SERPL: 1.4 (ref 1.1–2.2)
ALP SERPL-CCNC: 71 U/L (ref 45–117)
ALT SERPL-CCNC: 24 U/L (ref 12–78)
ANION GAP SERPL CALC-SCNC: 4 MMOL/L (ref 2–12)
AST SERPL-CCNC: 25 U/L (ref 15–37)
BILIRUB SERPL-MCNC: 0.8 MG/DL (ref 0.2–1)
BUN SERPL-MCNC: 10 MG/DL (ref 6–20)
BUN/CREAT SERPL: 15 (ref 12–20)
CALCIUM SERPL-MCNC: 9.9 MG/DL (ref 8.5–10.1)
CHLORIDE SERPL-SCNC: 107 MMOL/L (ref 97–108)
CHOLEST SERPL-MCNC: 185 MG/DL
CO2 SERPL-SCNC: 28 MMOL/L (ref 21–32)
CREAT SERPL-MCNC: 0.65 MG/DL (ref 0.55–1.02)
ERYTHROCYTE [DISTWIDTH] IN BLOOD BY AUTOMATED COUNT: 12.5 % (ref 11.5–14.5)
FERRITIN SERPL-MCNC: 30 NG/ML (ref 26–388)
GLOBULIN SER CALC-MCNC: 3 G/DL (ref 2–4)
GLUCOSE SERPL-MCNC: 101 MG/DL (ref 65–100)
HCT VFR BLD AUTO: 39.3 % (ref 35–47)
HDLC SERPL-MCNC: 66 MG/DL
HDLC SERPL: 2.8 (ref 0–5)
HGB BLD-MCNC: 13 G/DL (ref 11.5–16)
IRON SATN MFR SERPL: 21 % (ref 20–50)
IRON SERPL-MCNC: 84 UG/DL (ref 35–150)
LDLC SERPL CALC-MCNC: 99.8 MG/DL (ref 0–100)
MCH RBC QN AUTO: 30.4 PG (ref 26–34)
MCHC RBC AUTO-ENTMCNC: 33.1 G/DL (ref 30–36.5)
MCV RBC AUTO: 91.8 FL (ref 80–99)
NRBC # BLD: 0 K/UL (ref 0–0.01)
NRBC BLD-RTO: 0 PER 100 WBC
PLATELET # BLD AUTO: 268 K/UL (ref 150–400)
PMV BLD AUTO: 9 FL (ref 8.9–12.9)
POTASSIUM SERPL-SCNC: 4.7 MMOL/L (ref 3.5–5.1)
PROT SERPL-MCNC: 7.2 G/DL (ref 6.4–8.2)
RBC # BLD AUTO: 4.28 M/UL (ref 3.8–5.2)
SODIUM SERPL-SCNC: 139 MMOL/L (ref 136–145)
TIBC SERPL-MCNC: 400 UG/DL (ref 250–450)
TRIGL SERPL-MCNC: 96 MG/DL
VIT B12 SERPL-MCNC: 1066 PG/ML (ref 193–986)
VLDLC SERPL CALC-MCNC: 19.2 MG/DL
WBC # BLD AUTO: 3.2 K/UL (ref 3.6–11)

## 2025-04-10 ENCOUNTER — RESULTS FOLLOW-UP (OUTPATIENT)
Facility: CLINIC | Age: 70
End: 2025-04-10

## 2025-04-29 ENCOUNTER — PATIENT MESSAGE (OUTPATIENT)
Facility: CLINIC | Age: 70
End: 2025-04-29

## 2025-04-29 RX ORDER — AZITHROMYCIN 250 MG/1
TABLET, FILM COATED ORAL
Qty: 6 TABLET | Refills: 0 | Status: SHIPPED | OUTPATIENT
Start: 2025-04-29 | End: 2025-05-01

## 2025-05-01 RX ORDER — DOXYCYCLINE HYCLATE 100 MG
100 TABLET ORAL 2 TIMES DAILY
Qty: 14 TABLET | Refills: 0 | Status: SHIPPED | OUTPATIENT
Start: 2025-05-01 | End: 2025-05-08